# Patient Record
Sex: FEMALE | Race: WHITE | NOT HISPANIC OR LATINO | Employment: FULL TIME | URBAN - METROPOLITAN AREA
[De-identification: names, ages, dates, MRNs, and addresses within clinical notes are randomized per-mention and may not be internally consistent; named-entity substitution may affect disease eponyms.]

---

## 2023-04-13 PROBLEM — E78.5 HYPERLIPIDEMIA: Status: ACTIVE | Noted: 2023-04-13

## 2023-04-13 PROBLEM — Q79.60 EHLERS-DANLOS SYNDROME (HHS-HCC): Status: ACTIVE | Noted: 2023-04-13

## 2023-04-13 PROBLEM — M54.9 BACK PAIN, CHRONIC: Status: ACTIVE | Noted: 2023-04-13

## 2023-04-13 PROBLEM — G89.29 CHRONIC HEADACHES: Status: ACTIVE | Noted: 2023-04-13

## 2023-04-13 PROBLEM — E66.01 OBESITY, MORBID, BMI 50 OR HIGHER (MULTI): Status: ACTIVE | Noted: 2023-04-13

## 2023-04-13 PROBLEM — G89.29 BACK PAIN, CHRONIC: Status: ACTIVE | Noted: 2023-04-13

## 2023-04-13 PROBLEM — M25.562 ACUTE PAIN OF LEFT KNEE: Status: ACTIVE | Noted: 2023-04-13

## 2023-04-13 PROBLEM — M79.7 FIBROMYALGIA: Status: ACTIVE | Noted: 2023-04-13

## 2023-04-13 PROBLEM — E88.819 INSULIN RESISTANCE: Status: ACTIVE | Noted: 2023-04-13

## 2023-04-13 PROBLEM — R79.89 LOW VITAMIN D LEVEL: Status: ACTIVE | Noted: 2023-04-13

## 2023-04-13 PROBLEM — G43.909 MIGRAINES: Status: ACTIVE | Noted: 2023-04-13

## 2023-04-13 PROBLEM — F41.9 ANXIETY: Status: ACTIVE | Noted: 2023-04-13

## 2023-04-13 PROBLEM — B37.31 YEAST INFECTION OF THE VAGINA: Status: ACTIVE | Noted: 2023-04-13

## 2023-04-13 PROBLEM — J45.20 MILD INTERMITTENT ASTHMA WITHOUT COMPLICATION (HHS-HCC): Status: ACTIVE | Noted: 2023-04-13

## 2023-04-13 PROBLEM — R51.9 CHRONIC HEADACHES: Status: ACTIVE | Noted: 2023-04-13

## 2023-04-13 RX ORDER — METHYLPREDNISOLONE 4 MG/1
4 TABLET ORAL
COMMUNITY
Start: 2022-07-27 | End: 2023-04-14 | Stop reason: ALTCHOICE

## 2023-04-13 RX ORDER — GABAPENTIN 100 MG/1
100 CAPSULE ORAL 4 TIMES DAILY
COMMUNITY
End: 2023-07-05 | Stop reason: ALTCHOICE

## 2023-04-13 RX ORDER — TOPIRAMATE 100 MG/1
100 TABLET, FILM COATED ORAL 2 TIMES DAILY
COMMUNITY
End: 2023-04-14 | Stop reason: ALTCHOICE

## 2023-04-13 RX ORDER — SUMATRIPTAN SUCCINATE 4 MG/.5ML
INJECTION, SOLUTION SUBCUTANEOUS
COMMUNITY
Start: 2022-02-10 | End: 2023-04-14 | Stop reason: ALTCHOICE

## 2023-04-13 RX ORDER — ALBUTEROL SULFATE 90 UG/1
2 AEROSOL, METERED RESPIRATORY (INHALATION) EVERY 4 HOURS PRN
COMMUNITY
Start: 2020-09-25

## 2023-04-13 RX ORDER — CYCLOBENZAPRINE HCL 5 MG
5 TABLET ORAL 2 TIMES DAILY
COMMUNITY

## 2023-04-13 RX ORDER — CHOLECALCIFEROL (VITAMIN D3) 125 MCG
125 CAPSULE ORAL
COMMUNITY

## 2023-04-14 ENCOUNTER — OFFICE VISIT (OUTPATIENT)
Dept: PRIMARY CARE | Facility: CLINIC | Age: 48
End: 2023-04-14
Payer: COMMERCIAL

## 2023-04-14 VITALS
HEART RATE: 99 BPM | OXYGEN SATURATION: 97 % | DIASTOLIC BLOOD PRESSURE: 80 MMHG | SYSTOLIC BLOOD PRESSURE: 124 MMHG | HEIGHT: 63 IN | WEIGHT: 293 LBS | RESPIRATION RATE: 18 BRPM | BODY MASS INDEX: 51.91 KG/M2

## 2023-04-14 DIAGNOSIS — Z13.220 SCREENING FOR HYPERLIPIDEMIA: ICD-10-CM

## 2023-04-14 DIAGNOSIS — Z09 FOLLOW UP: ICD-10-CM

## 2023-04-14 DIAGNOSIS — G43.709 CHRONIC MIGRAINE WITHOUT AURA WITHOUT STATUS MIGRAINOSUS, NOT INTRACTABLE: Primary | ICD-10-CM

## 2023-04-14 DIAGNOSIS — R79.89 LOW VITAMIN D LEVEL: ICD-10-CM

## 2023-04-14 DIAGNOSIS — R73.01 ELEVATED FASTING BLOOD SUGAR: ICD-10-CM

## 2023-04-14 DIAGNOSIS — Z13.29 SCREENING FOR HYPOTHYROIDISM: ICD-10-CM

## 2023-04-14 DIAGNOSIS — E53.8 VITAMIN B 12 DEFICIENCY: ICD-10-CM

## 2023-04-14 DIAGNOSIS — Q79.60 EHLERS-DANLOS SYNDROME (HHS-HCC): ICD-10-CM

## 2023-04-14 PROCEDURE — 99214 OFFICE O/P EST MOD 30 MIN: CPT

## 2023-04-14 PROCEDURE — 4004F PT TOBACCO SCREEN RCVD TLK: CPT

## 2023-04-14 RX ORDER — BUTALBITAL, ACETAMINOPHEN AND CAFFEINE 300; 40; 50 MG/1; MG/1; MG/1
1 CAPSULE ORAL DAILY PRN
Qty: 30 CAPSULE | Refills: 0 | Status: SHIPPED | OUTPATIENT
Start: 2023-04-14 | End: 2023-05-14

## 2023-04-14 RX ORDER — GALCANEZUMAB 120 MG/ML
240 INJECTION, SOLUTION SUBCUTANEOUS ONCE
Qty: 2 EACH | Refills: 0 | Status: SHIPPED | OUTPATIENT
Start: 2023-04-14 | End: 2023-04-14

## 2023-04-14 RX ORDER — GALCANEZUMAB 120 MG/ML
120 INJECTION, SOLUTION SUBCUTANEOUS
Qty: 1 EACH | Refills: 2 | Status: SHIPPED | OUTPATIENT
Start: 2023-04-14 | End: 2023-04-19

## 2023-04-14 ASSESSMENT — ENCOUNTER SYMPTOMS
EYES NEGATIVE: 1
ALLERGIC/IMMUNOLOGIC NEGATIVE: 1
CARDIOVASCULAR NEGATIVE: 1
GASTROINTESTINAL NEGATIVE: 1
CONSTITUTIONAL NEGATIVE: 1
ENDOCRINE NEGATIVE: 1
RESPIRATORY NEGATIVE: 1
HEMATOLOGIC/LYMPHATIC NEGATIVE: 1
NEUROLOGICAL NEGATIVE: 1
MUSCULOSKELETAL NEGATIVE: 1
PSYCHIATRIC NEGATIVE: 1

## 2023-04-14 ASSESSMENT — PATIENT HEALTH QUESTIONNAIRE - PHQ9
SUM OF ALL RESPONSES TO PHQ9 QUESTIONS 1 AND 2: 0
2. FEELING DOWN, DEPRESSED OR HOPELESS: NOT AT ALL
1. LITTLE INTEREST OR PLEASURE IN DOING THINGS: NOT AT ALL

## 2023-04-14 NOTE — ASSESSMENT & PLAN NOTE
We discussed multiple avenues in regards to migraine treatment.  We have agreed to begin taking Emgality 120 mg prefilled syringe injection monthly.  I did let you know that the first dose for the month will be a double dose so you will have to inject the 2 syringes totaling 240 mg.  The following months will only require a single injection.  I have also sent Fioricet oral tablet daily as needed for breakthrough migraines.  Follow-up in 3 months.

## 2023-04-14 NOTE — PROGRESS NOTES
"Subjective   Patient ID: Yolanda Kumar is a 47 y.o. female who presents for Follow-up.    HPI a 47-year-old female past medical history of Mirela-Danlos syndrome, fibromyalgia, chronic migraines, and intermittent asthma without complications arrives to clinic with chief complaint of new patient establishment.  Patient is here to establish with a new provider as her previous one is no longer in network.  She does have a few concerns today about her migraines.  She was taking sumatriptan pen injectors as needed for migraines with as needed topiramate.  She recently found out from her insurance that she is no longer eligible to get these medications.  She is wondering if she can have a new medication regimen as her migraines are coming back worse than ever before and more frequently as well.  She denies any focal, hearing, vision deficits.  Other than this, patient denies any chest pain, shortness of breath, diarrhea, fevers, chills, head pain, COVID-like symptoms.    Review of Systems   Constitutional: Negative.    HENT: Negative.     Eyes: Negative.    Respiratory: Negative.     Cardiovascular: Negative.    Gastrointestinal: Negative.    Endocrine: Negative.    Genitourinary: Negative.    Musculoskeletal: Negative.    Skin: Negative.    Allergic/Immunologic: Negative.    Neurological: Negative.    Hematological: Negative.    Psychiatric/Behavioral: Negative.     All other systems reviewed and are negative.      Objective   /80 (BP Location: Right arm, BP Cuff Size: Large adult)   Pulse 99   Resp 18   Ht 1.6 m (5' 3\")   Wt 145 kg (320 lb 6.4 oz)   SpO2 97%   BMI 56.76 kg/m²     Physical Exam  Vitals and nursing note reviewed.   Constitutional:       Appearance: Normal appearance.   HENT:      Head: Normocephalic and atraumatic.      Right Ear: Tympanic membrane normal.      Left Ear: Tympanic membrane normal.      Nose: Nose normal.      Mouth/Throat:      Mouth: Mucous membranes are moist.      " Pharynx: Oropharynx is clear.   Eyes:      Extraocular Movements: Extraocular movements intact.      Conjunctiva/sclera: Conjunctivae normal.      Pupils: Pupils are equal, round, and reactive to light.   Cardiovascular:      Rate and Rhythm: Normal rate and regular rhythm.   Pulmonary:      Effort: Pulmonary effort is normal.      Breath sounds: Normal breath sounds.   Abdominal:      General: Bowel sounds are normal.      Palpations: Abdomen is soft.   Musculoskeletal:         General: Normal range of motion.      Cervical back: Normal range of motion and neck supple.   Skin:     General: Skin is warm.      Capillary Refill: Capillary refill takes less than 2 seconds.   Neurological:      General: No focal deficit present.      Mental Status: She is alert and oriented to person, place, and time. Mental status is at baseline.   Psychiatric:         Mood and Affect: Mood normal.         Behavior: Behavior normal.         Thought Content: Thought content normal.         Judgment: Judgment normal.         Assessment/Plan   Problem List Items Addressed This Visit          Musculoskeletal    Mirela-Danlos syndrome     Continue the treatment plan set forth by your rheumatologist.  Continue using gabapentin 100 mg oral tablet 4 times daily.            Other    Low vitamin D level    Relevant Orders    Vitamin D, Total    Migraines - Primary     We discussed multiple avenues in regards to migraine treatment.  We have agreed to begin taking Emgality 120 mg prefilled syringe injection monthly.  I did let you know that the first dose for the month will be a double dose so you will have to inject the 2 syringes totaling 240 mg.  The following months will only require a single injection.  I have also sent Fioricet oral tablet daily as needed for breakthrough migraines.  Follow-up in 3 months.         Relevant Medications    galcanezumab (Emgality Syringe) 120 mg/mL prefilled syringe    galcanezumab (Emgality Syringe) 120 mg/mL  prefilled syringe    butalbital-acetaminophen-caff (Fioricet) -40 mg capsule     Other Visit Diagnoses       Vitamin B 12 deficiency        Relevant Orders    CBC    Vitamin B12    Screening for hypothyroidism        Relevant Orders    TSH with reflex to Free T4 if abnormal    Screening for hyperlipidemia        Relevant Orders    Lipid Panel    Elevated fasting blood sugar        Relevant Orders    Comprehensive Metabolic Panel    Hemoglobin A1C    Follow up        Relevant Orders    Follow Up In [facility] - PCP          It was a pleasure seeing you in the office today.    I also advised you to follow low fat diet and exercise for at least 30 minutes daily.    Anticipatory guidance, age appropriate vaccines, screening exams, health promotion and prevention discussed.    This document was generated using the assistance of voice recognition software. If there are any errors of spelling, grammar, syntax, or meaning; please feel free to contact me directly for clarification.

## 2023-04-14 NOTE — ASSESSMENT & PLAN NOTE
Continue the treatment plan set forth by your rheumatologist.  Continue using gabapentin 100 mg oral tablet 4 times daily.

## 2023-04-14 NOTE — PROGRESS NOTES
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 She has imatrex injections and was also on topamax 100 mg twice daily. Insurance was not covering the topamax.

## 2023-04-15 ENCOUNTER — LAB (OUTPATIENT)
Dept: LAB | Facility: LAB | Age: 48
End: 2023-04-15
Payer: COMMERCIAL

## 2023-04-15 DIAGNOSIS — E53.8 VITAMIN B 12 DEFICIENCY: ICD-10-CM

## 2023-04-15 DIAGNOSIS — Z13.220 SCREENING FOR HYPERLIPIDEMIA: ICD-10-CM

## 2023-04-15 DIAGNOSIS — R79.89 LOW VITAMIN D LEVEL: ICD-10-CM

## 2023-04-15 DIAGNOSIS — R73.01 ELEVATED FASTING BLOOD SUGAR: ICD-10-CM

## 2023-04-15 DIAGNOSIS — Z13.29 SCREENING FOR HYPOTHYROIDISM: ICD-10-CM

## 2023-04-15 LAB
ALANINE AMINOTRANSFERASE (SGPT) (U/L) IN SER/PLAS: 14 U/L (ref 7–45)
ALBUMIN (G/DL) IN SER/PLAS: 3.7 G/DL (ref 3.4–5)
ALKALINE PHOSPHATASE (U/L) IN SER/PLAS: 74 U/L (ref 33–110)
ANION GAP IN SER/PLAS: 14 MMOL/L (ref 10–20)
ASPARTATE AMINOTRANSFERASE (SGOT) (U/L) IN SER/PLAS: 14 U/L (ref 9–39)
BILIRUBIN TOTAL (MG/DL) IN SER/PLAS: 0.4 MG/DL (ref 0–1.2)
CALCIDIOL (25 OH VITAMIN D3) (NG/ML) IN SER/PLAS: 11 NG/ML
CALCIUM (MG/DL) IN SER/PLAS: 8.5 MG/DL (ref 8.6–10.3)
CARBON DIOXIDE, TOTAL (MMOL/L) IN SER/PLAS: 22 MMOL/L (ref 21–32)
CHLORIDE (MMOL/L) IN SER/PLAS: 106 MMOL/L (ref 98–107)
CHOLESTEROL (MG/DL) IN SER/PLAS: 212 MG/DL (ref 0–199)
CHOLESTEROL IN HDL (MG/DL) IN SER/PLAS: 46.3 MG/DL
CHOLESTEROL/HDL RATIO: 4.6
COBALAMIN (VITAMIN B12) (PG/ML) IN SER/PLAS: 169 PG/ML (ref 211–911)
CREATININE (MG/DL) IN SER/PLAS: 0.56 MG/DL (ref 0.5–1.05)
ERYTHROCYTE DISTRIBUTION WIDTH (RATIO) BY AUTOMATED COUNT: 13.6 % (ref 11.5–14.5)
ERYTHROCYTE MEAN CORPUSCULAR HEMOGLOBIN CONCENTRATION (G/DL) BY AUTOMATED: 32.4 G/DL (ref 32–36)
ERYTHROCYTE MEAN CORPUSCULAR VOLUME (FL) BY AUTOMATED COUNT: 93 FL (ref 80–100)
ERYTHROCYTES (10*6/UL) IN BLOOD BY AUTOMATED COUNT: 4.99 X10E12/L (ref 4–5.2)
ESTIMATED AVERAGE GLUCOSE FOR HBA1C: 111 MG/DL
GFR FEMALE: >90 ML/MIN/1.73M2
GLUCOSE (MG/DL) IN SER/PLAS: 83 MG/DL (ref 74–99)
HEMATOCRIT (%) IN BLOOD BY AUTOMATED COUNT: 46.6 % (ref 36–46)
HEMOGLOBIN (G/DL) IN BLOOD: 15.1 G/DL (ref 12–16)
HEMOGLOBIN A1C/HEMOGLOBIN TOTAL IN BLOOD: 5.5 %
LDL: 145 MG/DL (ref 0–99)
LEUKOCYTES (10*3/UL) IN BLOOD BY AUTOMATED COUNT: 8 X10E9/L (ref 4.4–11.3)
PLATELETS (10*3/UL) IN BLOOD AUTOMATED COUNT: 318 X10E9/L (ref 150–450)
POTASSIUM (MMOL/L) IN SER/PLAS: 4.5 MMOL/L (ref 3.5–5.3)
PROTEIN TOTAL: 6.1 G/DL (ref 6.4–8.2)
SODIUM (MMOL/L) IN SER/PLAS: 137 MMOL/L (ref 136–145)
THYROTROPIN (MIU/L) IN SER/PLAS BY DETECTION LIMIT <= 0.05 MIU/L: 3.1 MIU/L (ref 0.44–3.98)
TRIGLYCERIDE (MG/DL) IN SER/PLAS: 104 MG/DL (ref 0–149)
UREA NITROGEN (MG/DL) IN SER/PLAS: 13 MG/DL (ref 6–23)
VLDL: 21 MG/DL (ref 0–40)

## 2023-04-15 PROCEDURE — 83036 HEMOGLOBIN GLYCOSYLATED A1C: CPT

## 2023-04-15 PROCEDURE — 80053 COMPREHEN METABOLIC PANEL: CPT

## 2023-04-15 PROCEDURE — 82607 VITAMIN B-12: CPT

## 2023-04-15 PROCEDURE — 85027 COMPLETE CBC AUTOMATED: CPT

## 2023-04-15 PROCEDURE — 36415 COLL VENOUS BLD VENIPUNCTURE: CPT

## 2023-04-15 PROCEDURE — 84443 ASSAY THYROID STIM HORMONE: CPT

## 2023-04-15 PROCEDURE — 80061 LIPID PANEL: CPT

## 2023-04-15 PROCEDURE — 82306 VITAMIN D 25 HYDROXY: CPT

## 2023-04-18 ENCOUNTER — TELEPHONE (OUTPATIENT)
Dept: PRIMARY CARE | Facility: CLINIC | Age: 48
End: 2023-04-18
Payer: COMMERCIAL

## 2023-04-18 DIAGNOSIS — G43.719 INTRACTABLE CHRONIC MIGRAINE WITHOUT AURA AND WITHOUT STATUS MIGRAINOSUS: Primary | ICD-10-CM

## 2023-04-18 NOTE — TELEPHONE ENCOUNTER
She called about wanting to know if her auth for the Emgality was approved cause your ins wont cover it or is there anything else you can take please call her at [phone]

## 2023-04-19 NOTE — TELEPHONE ENCOUNTER
Please let her know that I have sent in Nurtec 75mg oral tablet every other day for her migraines. If insurance will not cover this one, we will go back to the sumatriptan injections. Thanks

## 2023-04-19 NOTE — TELEPHONE ENCOUNTER
Per patient - this was denied by insurance and she is asking for something else that will be covered by insurance.

## 2023-05-18 ENCOUNTER — TELEPHONE (OUTPATIENT)
Dept: PRIMARY CARE | Facility: CLINIC | Age: 48
End: 2023-05-18
Payer: COMMERCIAL

## 2023-05-18 DIAGNOSIS — G43.719 INTRACTABLE CHRONIC MIGRAINE WITHOUT AURA AND WITHOUT STATUS MIGRAINOSUS: ICD-10-CM

## 2023-07-05 DIAGNOSIS — G43.709 CHRONIC MIGRAINE WITHOUT AURA WITHOUT STATUS MIGRAINOSUS, NOT INTRACTABLE: Primary | ICD-10-CM

## 2023-07-05 RX ORDER — GABAPENTIN 400 MG/1
400 CAPSULE ORAL 4 TIMES DAILY
COMMUNITY

## 2023-07-05 RX ORDER — BUTALBITAL, ACETAMINOPHEN AND CAFFEINE 300; 40; 50 MG/1; MG/1; MG/1
1 CAPSULE ORAL DAILY PRN
COMMUNITY
End: 2023-07-05 | Stop reason: SDUPTHER

## 2023-07-05 RX ORDER — AMITRIPTYLINE HYDROCHLORIDE 10 MG/1
10 TABLET, FILM COATED ORAL NIGHTLY
COMMUNITY

## 2023-07-05 RX ORDER — BUTALBITAL, ACETAMINOPHEN AND CAFFEINE 300; 40; 50 MG/1; MG/1; MG/1
1 CAPSULE ORAL DAILY PRN
Qty: 14 CAPSULE | Refills: 0 | Status: SHIPPED | OUTPATIENT
Start: 2023-07-05

## 2023-07-05 RX ORDER — TOPIRAMATE 50 MG/1
50 TABLET, FILM COATED ORAL 2 TIMES DAILY
Qty: 60 TABLET | Refills: 5 | COMMUNITY
Start: 2023-06-05 | End: 2023-12-02

## 2023-07-14 ENCOUNTER — OFFICE VISIT (OUTPATIENT)
Dept: PRIMARY CARE | Facility: CLINIC | Age: 48
End: 2023-07-14
Payer: COMMERCIAL

## 2023-07-14 VITALS
HEART RATE: 107 BPM | BODY MASS INDEX: 51.91 KG/M2 | OXYGEN SATURATION: 98 % | DIASTOLIC BLOOD PRESSURE: 78 MMHG | WEIGHT: 293 LBS | HEIGHT: 63 IN | RESPIRATION RATE: 16 BRPM | SYSTOLIC BLOOD PRESSURE: 124 MMHG

## 2023-07-14 DIAGNOSIS — Q79.60 EHLERS-DANLOS SYNDROME (HHS-HCC): Primary | ICD-10-CM

## 2023-07-14 DIAGNOSIS — G43.709 CHRONIC MIGRAINE WITHOUT AURA WITHOUT STATUS MIGRAINOSUS, NOT INTRACTABLE: ICD-10-CM

## 2023-07-14 DIAGNOSIS — Z09 FOLLOW UP: ICD-10-CM

## 2023-07-14 DIAGNOSIS — R63.5 WEIGHT GAIN: ICD-10-CM

## 2023-07-14 DIAGNOSIS — R22.43 LOCALIZED SWELLING OF BOTH LOWER LEGS: ICD-10-CM

## 2023-07-14 PROCEDURE — 4004F PT TOBACCO SCREEN RCVD TLK: CPT

## 2023-07-14 PROCEDURE — 99214 OFFICE O/P EST MOD 30 MIN: CPT

## 2023-07-14 RX ORDER — TORSEMIDE 10 MG/1
10 TABLET ORAL DAILY
Qty: 30 TABLET | Refills: 0 | Status: SHIPPED | OUTPATIENT
Start: 2023-07-14 | End: 2023-08-16 | Stop reason: SDUPTHER

## 2023-07-14 RX ORDER — BUPROPION HYDROCHLORIDE 75 MG/1
75 TABLET ORAL DAILY
Qty: 30 TABLET | Refills: 0 | Status: SHIPPED | OUTPATIENT
Start: 2023-07-14 | End: 2023-08-16 | Stop reason: SDUPTHER

## 2023-07-14 RX ORDER — NALTREXONE HYDROCHLORIDE 50 MG/1
50 TABLET, FILM COATED ORAL DAILY
Qty: 30 TABLET | Refills: 0 | Status: SHIPPED | OUTPATIENT
Start: 2023-07-14 | End: 2023-08-16 | Stop reason: SDUPTHER

## 2023-07-14 ASSESSMENT — PATIENT HEALTH QUESTIONNAIRE - PHQ9
2. FEELING DOWN, DEPRESSED OR HOPELESS: NOT AT ALL
SUM OF ALL RESPONSES TO PHQ9 QUESTIONS 1 AND 2: 0
1. LITTLE INTEREST OR PLEASURE IN DOING THINGS: NOT AT ALL

## 2023-07-14 ASSESSMENT — ENCOUNTER SYMPTOMS
CARDIOVASCULAR NEGATIVE: 1
GASTROINTESTINAL NEGATIVE: 1
PSYCHIATRIC NEGATIVE: 1
MUSCULOSKELETAL NEGATIVE: 1
ENDOCRINE NEGATIVE: 1
HEMATOLOGIC/LYMPHATIC NEGATIVE: 1
NEUROLOGICAL NEGATIVE: 1
LOSS OF SENSATION IN FEET: 0
OCCASIONAL FEELINGS OF UNSTEADINESS: 0
CONSTITUTIONAL NEGATIVE: 1
EYES NEGATIVE: 1
RESPIRATORY NEGATIVE: 1
DEPRESSION: 0
ALLERGIC/IMMUNOLOGIC NEGATIVE: 1

## 2023-07-14 ASSESSMENT — ANXIETY QUESTIONNAIRES
1. FEELING NERVOUS, ANXIOUS, OR ON EDGE: NOT AT ALL
2. NOT BEING ABLE TO STOP OR CONTROL WORRYING: NOT AT ALL
7. FEELING AFRAID AS IF SOMETHING AWFUL MIGHT HAPPEN: NOT AT ALL
IF YOU CHECKED OFF ANY PROBLEMS ON THIS QUESTIONNAIRE, HOW DIFFICULT HAVE THESE PROBLEMS MADE IT FOR YOU TO DO YOUR WORK, TAKE CARE OF THINGS AT HOME, OR GET ALONG WITH OTHER PEOPLE: NOT DIFFICULT AT ALL
6. BECOMING EASILY ANNOYED OR IRRITABLE: NOT AT ALL
5. BEING SO RESTLESS THAT IT IS HARD TO SIT STILL: NOT AT ALL
3. WORRYING TOO MUCH ABOUT DIFFERENT THINGS: NOT AT ALL
GAD7 TOTAL SCORE: 0
4. TROUBLE RELAXING: NOT AT ALL

## 2023-07-14 NOTE — PROGRESS NOTES
"Subjective   Patient ID: Yolanda Kumar is a 48 y.o. female who presents for Follow-up.    HPI a 48-year-old female with past medical history of Mirela-Danlos syndrome, chronic migraines, asthma, current cigarette smoker, chronic lower back pain arrives to the clinic with chief complaint of 3-month follow-up.  At her last appointment, she was given directions to complete blood work.  She did complete that today and would like to review them.  She was also placed on sumatriptan injections and topiramate daily for her migraines in the past.  We did switch her migraine cocktail regimen since her last appointment.  She reports success with the Nurtec 75 mg every other day, Elavil 10 mg nightly, and topiramate 50 mg oral tablet twice a day with Fioricet as needed.  She states that her migraines decreased from daily to about 10-15 migraines a month she states that this is much more manageable.  She is here for further evaluation and health maintenance over the dependent lower extremity swelling and weight loss therapy.    Review of Systems   Constitutional: Negative.    HENT: Negative.     Eyes: Negative.    Respiratory: Negative.     Cardiovascular: Negative.    Gastrointestinal: Negative.    Endocrine: Negative.    Genitourinary: Negative.    Musculoskeletal: Negative.    Skin: Negative.    Allergic/Immunologic: Negative.    Neurological: Negative.    Hematological: Negative.    Psychiatric/Behavioral: Negative.     All other systems reviewed and are negative.      Objective   /78   Pulse 107   Resp 16   Ht 1.6 m (5' 3\")   Wt 144 kg (317 lb 9.6 oz)   SpO2 98%   BMI 56.26 kg/m²     Physical Exam  Vitals and nursing note reviewed.   Constitutional:       Appearance: Normal appearance.   HENT:      Head: Normocephalic and atraumatic.      Right Ear: Tympanic membrane normal.      Left Ear: Tympanic membrane normal.      Nose: Nose normal.      Mouth/Throat:      Mouth: Mucous membranes are moist.      " Pharynx: Oropharynx is clear.   Eyes:      Extraocular Movements: Extraocular movements intact.      Conjunctiva/sclera: Conjunctivae normal.      Pupils: Pupils are equal, round, and reactive to light.   Cardiovascular:      Rate and Rhythm: Normal rate and regular rhythm.   Pulmonary:      Effort: Pulmonary effort is normal.      Breath sounds: Normal breath sounds.   Abdominal:      General: Bowel sounds are normal.      Palpations: Abdomen is soft.   Musculoskeletal:         General: Normal range of motion.      Cervical back: Normal range of motion and neck supple.   Skin:     General: Skin is warm.      Capillary Refill: Capillary refill takes less than 2 seconds.   Neurological:      General: No focal deficit present.      Mental Status: She is alert and oriented to person, place, and time. Mental status is at baseline.   Psychiatric:         Mood and Affect: Mood normal.         Behavior: Behavior normal.         Thought Content: Thought content normal.         Judgment: Judgment normal.         Assessment/Plan   Problem List Items Addressed This Visit       Mirela-Danlos syndrome - Primary    Relevant Orders    Follow Up In [facility] - PCP - Established    Migraines    Relevant Medications    rimegepant (Nurtec ODT) 75 mg tablet,disintegrating    Other Relevant Orders    Follow Up [facility] - PCP - Established     Other Visit Diagnoses       Follow up        Relevant Orders    Follow Up [facility] - PCP - Established    Localized swelling of both lower legs        Relevant Medications    torsemide (Demadex) 10 mg tablet    Other Relevant Orders    Referral to Vascular Medicine    Follow Up In [facility] - PCP - Established    Weight gain        Relevant Medications    buPROPion (Wellbutrin) 75 mg tablet    naltrexone (Depade) 50 mg tablet          It was a pleasure seeing you in the office today.    Please begin taking Torsemide 10mg oral tablet daily for  swelling of the feet. Please elevate your feet and wear compression stockings at this time. Follow up with Vascular Medicine Emily Tian CNP for further eval and maintenance.     Lab work is unremarkable unless otherwise stated below:    Vitamin D is decreased. Begin OTC vitamin D3 + Calcium.    Vitamin B12 is decreased. Begin Vitamin B12 complex OTC.    You report that your migraines have decreased from daily to roughly 10-15 times a month which you report is manageable. You stated in the office today that you would like to continue your medication management of Elavil 10mg night, fioricet PRN, nurtec 75mg every other day, and topiramate 50mg BID. You will call us back for worsening symptoms.     You report having the inability to lose weight. Please begin Wellbutrin 75mg oral tablet daily and naltrexone 50mg 1/2 tablet daily. These medications combine is equivalent to contrave. Follow up in 1 month.    I also advised you to follow low fat diet and exercise for at least 30 minutes daily.    Anticipatory guidance, age appropriate vaccines, screening exams, health promotion and prevention discussed.    This document was generated using the assistance of voice recognition software. If there are any errors of spelling, grammar, syntax, or meaning; please feel free to contact me directly for clarification.

## 2023-08-16 ENCOUNTER — OFFICE VISIT (OUTPATIENT)
Dept: PRIMARY CARE | Facility: CLINIC | Age: 48
End: 2023-08-16
Payer: COMMERCIAL

## 2023-08-16 VITALS
RESPIRATION RATE: 16 BRPM | SYSTOLIC BLOOD PRESSURE: 108 MMHG | BODY MASS INDEX: 51.91 KG/M2 | OXYGEN SATURATION: 99 % | WEIGHT: 293 LBS | HEIGHT: 63 IN | DIASTOLIC BLOOD PRESSURE: 68 MMHG | HEART RATE: 92 BPM

## 2023-08-16 DIAGNOSIS — R22.43 LOCALIZED SWELLING OF BOTH LOWER LEGS: ICD-10-CM

## 2023-08-16 DIAGNOSIS — Z09 ENCOUNTER FOR FOLLOW-UP: Primary | ICD-10-CM

## 2023-08-16 DIAGNOSIS — R63.5 WEIGHT GAIN: ICD-10-CM

## 2023-08-16 DIAGNOSIS — G43.709 CHRONIC MIGRAINE WITHOUT AURA WITHOUT STATUS MIGRAINOSUS, NOT INTRACTABLE: ICD-10-CM

## 2023-08-16 DIAGNOSIS — Q79.60 EHLERS-DANLOS SYNDROME (HHS-HCC): ICD-10-CM

## 2023-08-16 PROCEDURE — 99213 OFFICE O/P EST LOW 20 MIN: CPT

## 2023-08-16 PROCEDURE — 4004F PT TOBACCO SCREEN RCVD TLK: CPT

## 2023-08-16 RX ORDER — NALTREXONE HYDROCHLORIDE 50 MG/1
25 TABLET, FILM COATED ORAL DAILY
Qty: 45 TABLET | Refills: 3 | Status: SHIPPED | OUTPATIENT
Start: 2023-08-16 | End: 2024-02-21 | Stop reason: SDUPTHER

## 2023-08-16 RX ORDER — BUPROPION HYDROCHLORIDE 75 MG/1
150 TABLET ORAL DAILY
Qty: 180 TABLET | Refills: 3 | Status: SHIPPED | OUTPATIENT
Start: 2023-08-16 | End: 2024-02-21 | Stop reason: SDUPTHER

## 2023-08-16 RX ORDER — TORSEMIDE 10 MG/1
10 TABLET ORAL DAILY
Qty: 90 TABLET | Refills: 3 | Status: SHIPPED | OUTPATIENT
Start: 2023-08-16 | End: 2024-02-21 | Stop reason: SDUPTHER

## 2023-08-16 ASSESSMENT — ANXIETY QUESTIONNAIRES
GAD7 TOTAL SCORE: 0
4. TROUBLE RELAXING: NOT AT ALL
3. WORRYING TOO MUCH ABOUT DIFFERENT THINGS: NOT AT ALL
IF YOU CHECKED OFF ANY PROBLEMS ON THIS QUESTIONNAIRE, HOW DIFFICULT HAVE THESE PROBLEMS MADE IT FOR YOU TO DO YOUR WORK, TAKE CARE OF THINGS AT HOME, OR GET ALONG WITH OTHER PEOPLE: NOT DIFFICULT AT ALL
6. BECOMING EASILY ANNOYED OR IRRITABLE: NOT AT ALL
7. FEELING AFRAID AS IF SOMETHING AWFUL MIGHT HAPPEN: NOT AT ALL
1. FEELING NERVOUS, ANXIOUS, OR ON EDGE: NOT AT ALL
2. NOT BEING ABLE TO STOP OR CONTROL WORRYING: NOT AT ALL
5. BEING SO RESTLESS THAT IT IS HARD TO SIT STILL: NOT AT ALL

## 2023-08-16 ASSESSMENT — ENCOUNTER SYMPTOMS
LOSS OF SENSATION IN FEET: 0
DEPRESSION: 0
NEUROLOGICAL NEGATIVE: 1
MUSCULOSKELETAL NEGATIVE: 1
ENDOCRINE NEGATIVE: 1
HEMATOLOGIC/LYMPHATIC NEGATIVE: 1
GASTROINTESTINAL NEGATIVE: 1
ALLERGIC/IMMUNOLOGIC NEGATIVE: 1
EYES NEGATIVE: 1
OCCASIONAL FEELINGS OF UNSTEADINESS: 0
RESPIRATORY NEGATIVE: 1
CARDIOVASCULAR NEGATIVE: 1
CONSTITUTIONAL NEGATIVE: 1
PSYCHIATRIC NEGATIVE: 1

## 2023-08-16 ASSESSMENT — PATIENT HEALTH QUESTIONNAIRE - PHQ9
1. LITTLE INTEREST OR PLEASURE IN DOING THINGS: NOT AT ALL
SUM OF ALL RESPONSES TO PHQ9 QUESTIONS 1 AND 2: 0
2. FEELING DOWN, DEPRESSED OR HOPELESS: NOT AT ALL

## 2023-08-16 NOTE — PATIENT INSTRUCTIONS
6 month follow up with Ivonne Mars CNP.    No additional lab work necessary.    Medications sent to pharmacy.

## 2023-08-16 NOTE — PROGRESS NOTES
"Subjective   Patient ID: Yolanda Kumar is a 48 y.o. female who presents for Follow-up.    HPI a 48-year-old female with past medical history of Mirela-Danlos syndrome, chronic migraines, asthma, current cigarette smoker, chronic lower back pain arrives to the clinic with chief complaint of 1-month follow-up. At her last appointment, she was started on wellbutrin and naltrexone for smoking cessation, weight loss, and appetite management. She reports success with this new medication regimen. She also reports success with her migraine regimen consisting of Nurtec 75 mg every other day, Elavil 10 mg nightly, and topiramate 50 mg oral tablet twice a day with Fioricet as needed. She states that her migraines decreased from daily to about 1-2 migraines a month. [facility] reports that there is nothing vascular related in regards to your bilateral lower extremity swelling and that she should simply wear stockings to help with swelling.  She reports success with the torsemide as well.    Review of Systems   Constitutional: Negative.    HENT: Negative.     Eyes: Negative.    Respiratory: Negative.     Cardiovascular: Negative.    Gastrointestinal: Negative.    Endocrine: Negative.    Genitourinary: Negative.    Musculoskeletal: Negative.    Skin: Negative.    Allergic/Immunologic: Negative.    Neurological: Negative.    Hematological: Negative.    Psychiatric/Behavioral: Negative.     All other systems reviewed and are negative.      Objective   /68   Pulse 92   Resp 16   Ht 1.6 m (5' 3\")   Wt 144 kg (317 lb 6.4 oz)   SpO2 99%   BMI 56.22 kg/m²     Physical Exam  Vitals and nursing note reviewed.   Constitutional:       Appearance: Normal appearance.   HENT:      Head: Normocephalic and atraumatic.      Right Ear: Tympanic membrane normal.      Left Ear: Tympanic membrane normal.      Nose: Nose normal.      Mouth/Throat:      Mouth: Mucous membranes are moist.    "   Pharynx: Oropharynx is clear.   Eyes:      Extraocular Movements: Extraocular movements intact.      Conjunctiva/sclera: Conjunctivae normal.      Pupils: Pupils are equal, round, and reactive to light.   Cardiovascular:      Rate and Rhythm: Normal rate and regular rhythm.   Pulmonary:      Effort: Pulmonary effort is normal.      Breath sounds: Normal breath sounds.   Abdominal:      General: Bowel sounds are normal.      Palpations: Abdomen is soft.   Musculoskeletal:         General: Normal range of motion.      Cervical back: Normal range of motion and neck supple.   Skin:     General: Skin is warm.      Capillary Refill: Capillary refill takes less than 2 seconds.   Neurological:      General: No focal deficit present.      Mental Status: She is alert and oriented to person, place, and time. Mental status is at baseline.   Psychiatric:         Mood and Affect: Mood normal.         Behavior: Behavior normal.         Thought Content: Thought content normal.         Judgment: Judgment normal.         Assessment/Plan   Problem List Items Addressed This Visit       Mirela-Danlos syndrome    Relevant Orders    Follow Up In [facility] - PCP - Established    Migraines    Relevant Medications    rimegepant (Nurtec ODT) 75 mg tablet,disintegrating    Other Relevant Orders    Follow Up In [facility] - PCP - Established     Other Visit Diagnoses       Encounter for follow-up    -  Primary    Relevant Orders    Follow Up In [facility] - PCP - Established    Localized swelling of both lower legs        Relevant Medications    torsemide (Demadex) 10 mg tablet    Other Relevant Orders    Follow Up In [facility] - PCP - Established    Weight gain        Relevant Medications    naltrexone (Depade) 50 mg tablet    buPROPion (Wellbutrin) 75 mg tablet    Other Relevant Orders    Follow Up In [facility] - PCP - Established          It was a pleasure seeing you in the office  today.    You report success with Wellbutrin 75 mg oral tablet daily and naltrexone 25 mg oral tablet daily weight management, smoking cessation, appetite suppressant.  We have increased your Wellbutrin 75 mg oral tablet daily to Wellbutrin 150 mg oral tablet daily.  Continue naltrexone 25 mg oral tablet daily.    Continue migraine cocktail as prescribed as they are working control your symptoms.    No additional screening exams or vaccinations are warranted at this time.    No additional lab work is necessary at this time either.    Your next appointment will be in 6 months with Ivonne NOBLE.    I also advised you to follow low fat diet and exercise for at least 30 minutes daily.    Anticipatory guidance, age appropriate vaccines, screening exams, health promotion and prevention discussed.    This document was generated using the assistance of voice recognition software. If there are any errors of spelling, grammar, syntax, or meaning; please feel free to contact me directly for clarification.

## 2024-01-31 ENCOUNTER — HOSPITAL ENCOUNTER (EMERGENCY)
Facility: HOSPITAL | Age: 49
Discharge: HOME | End: 2024-01-31
Payer: COMMERCIAL

## 2024-01-31 VITALS
DIASTOLIC BLOOD PRESSURE: 86 MMHG | HEIGHT: 63 IN | SYSTOLIC BLOOD PRESSURE: 131 MMHG | OXYGEN SATURATION: 99 % | RESPIRATION RATE: 12 BRPM | WEIGHT: 293 LBS | HEART RATE: 98 BPM | TEMPERATURE: 97.1 F | BODY MASS INDEX: 51.91 KG/M2

## 2024-01-31 DIAGNOSIS — L03.012 CELLULITIS OF FINGER OF LEFT HAND: Primary | ICD-10-CM

## 2024-01-31 PROCEDURE — 2500000004 HC RX 250 GENERAL PHARMACY W/ HCPCS (ALT 636 FOR OP/ED): Performed by: NURSE PRACTITIONER

## 2024-01-31 PROCEDURE — 99283 EMERGENCY DEPT VISIT LOW MDM: CPT

## 2024-01-31 PROCEDURE — 2500000001 HC RX 250 WO HCPCS SELF ADMINISTERED DRUGS (ALT 637 FOR MEDICARE OP): Performed by: NURSE PRACTITIONER

## 2024-01-31 RX ORDER — NAPROXEN 500 MG/1
500 TABLET ORAL 2 TIMES DAILY PRN
Qty: 14 TABLET | Refills: 0 | Status: SHIPPED | OUTPATIENT
Start: 2024-01-31 | End: 2024-02-07

## 2024-01-31 RX ORDER — CEPHALEXIN 500 MG/1
500 CAPSULE ORAL 4 TIMES DAILY
Qty: 28 CAPSULE | Refills: 0 | Status: SHIPPED | OUTPATIENT
Start: 2024-01-31 | End: 2024-02-07

## 2024-01-31 RX ORDER — SULFAMETHOXAZOLE AND TRIMETHOPRIM 800; 160 MG/1; MG/1
1 TABLET ORAL 2 TIMES DAILY
Qty: 14 TABLET | Refills: 0 | Status: SHIPPED | OUTPATIENT
Start: 2024-01-31 | End: 2024-02-07

## 2024-01-31 RX ORDER — CEPHALEXIN 250 MG/1
500 CAPSULE ORAL ONCE
Status: COMPLETED | OUTPATIENT
Start: 2024-01-31 | End: 2024-01-31

## 2024-01-31 RX ORDER — SULFAMETHOXAZOLE AND TRIMETHOPRIM 800; 160 MG/1; MG/1
1 TABLET ORAL ONCE
Status: COMPLETED | OUTPATIENT
Start: 2024-01-31 | End: 2024-01-31

## 2024-01-31 RX ADMIN — CEPHALEXIN 500 MG: 250 CAPSULE ORAL at 10:53

## 2024-01-31 RX ADMIN — SULFAMETHOXAZOLE AND TRIMETHOPRIM 1 TABLET: 800; 160 TABLET ORAL at 10:53

## 2024-01-31 ASSESSMENT — PAIN - FUNCTIONAL ASSESSMENT: PAIN_FUNCTIONAL_ASSESSMENT: 0-10

## 2024-01-31 ASSESSMENT — PAIN DESCRIPTION - PAIN TYPE: TYPE: ACUTE PAIN

## 2024-01-31 ASSESSMENT — PAIN SCALES - GENERAL: PAINLEVEL_OUTOF10: 10 - WORST POSSIBLE PAIN

## 2024-01-31 ASSESSMENT — COLUMBIA-SUICIDE SEVERITY RATING SCALE - C-SSRS
6. HAVE YOU EVER DONE ANYTHING, STARTED TO DO ANYTHING, OR PREPARED TO DO ANYTHING TO END YOUR LIFE?: NO
2. HAVE YOU ACTUALLY HAD ANY THOUGHTS OF KILLING YOURSELF?: NO
1. IN THE PAST MONTH, HAVE YOU WISHED YOU WERE DEAD OR WISHED YOU COULD GO TO SLEEP AND NOT WAKE UP?: NO

## 2024-01-31 NOTE — ED PROVIDER NOTES
HPI   Chief Complaint   Patient presents with    Hand Pain     2nd digit        48-year-old female with a past history of hypertension and fibromyalgia presents to the emergency department today for left second finger pain.  Patient states that she had a staple go into the tip of her finger a couple of days ago.  She developed some discomfort over the past 2 days however last night seem to worsen and this morning when she woke up she noticed swelling in the finger.  Patient states that she is having significant pain with movement of the finger however she is able to flex and extend.  The swelling began at the tip of the finger is now slightly going down proximally.  No fever or chills.  She is not having any discharge.  Redness and warmth.  She has not noticed any streaking.  No injury concerning for fracture.  Doing well overall otherwise.                          Lamont Coma Scale Score: 15                  Patient History   Past Medical History:   Diagnosis Date    Acquired clubfoot, right foot     Acquired bilateral club feet    Essential (primary) hypertension 09/25/2020    HTN (hypertension), benign    Fibromyalgia, primary     Interstitial cystitis (chronic) without hematuria     IC (interstitial cystitis)    Personal history of other diseases of the nervous system and sense organs     History of cataract    Personal history of other diseases of the respiratory system     History of allergic rhinitis     Past Surgical History:   Procedure Laterality Date    OTHER SURGICAL HISTORY  09/23/2020    Cholecystectomy    OTHER SURGICAL HISTORY  09/23/2020    Tonsillectomy with adenoidectomy    OTHER SURGICAL HISTORY  09/23/2020    Oophorectomy    OTHER SURGICAL HISTORY  09/23/2020    Foot surgery    OTHER SURGICAL HISTORY  09/23/2020    Cataract surgery    OTHER SURGICAL HISTORY  02/10/2022    Hysterectomy     No family history on file.  Social History     Tobacco Use    Smoking status: Every Day     Packs/day: 0.50      Years: 25.00     Additional pack years: 0.00     Total pack years: 12.50     Types: Cigarettes    Smokeless tobacco: Never   Vaping Use    Vaping Use: Never used   Substance Use Topics    Alcohol use: Not on file    Drug use: Never       Physical Exam   ED Triage Vitals [01/31/24 0931]   Temperature Heart Rate Respirations BP   36.2 °C (97.1 °F) 98 12 131/86      Pulse Ox Temp Source Heart Rate Source Patient Position   99 % Tympanic -- --      BP Location FiO2 (%)     -- --       Physical Exam  Vitals reviewed.   Constitutional:       Appearance: Normal appearance.   HENT:      Head: Normocephalic.   Cardiovascular:      Rate and Rhythm: Normal rate and regular rhythm.   Pulmonary:      Effort: Pulmonary effort is normal.      Breath sounds: Normal breath sounds.   Abdominal:      General: Abdomen is flat.      Palpations: Abdomen is soft.   Musculoskeletal:      Right hand: Normal.      Left hand: Tenderness present. No lacerations or bony tenderness. Decreased range of motion. Normal strength. Normal capillary refill. Normal pulse.        Hands:       Comments: Erythema, swelling, warmth.  Full active and passive range of motion.  No abscess.   Skin:     General: Skin is warm and dry.      Capillary Refill: Capillary refill takes less than 2 seconds.   Neurological:      Mental Status: She is alert.         Labs Reviewed - No data to display  Pain Management Panel           No data to display              No orders to display       ED Course & MDM   Diagnoses as of 01/31/24 1034   Cellulitis of finger of left hand       Medical Decision Making  On initial evaluation patient is well-appearing the emergency department at this time.  She is having discomfort in the finger.  There is no uniform swelling.  Patient has active and passive range of motion with no pain with passive extension.  No percussion tenderness or uniform swelling therefore lower suspicion for flexor tenosynovitis.  There is no lymphangitic  streaking.  Patient has no fever or chills.  Vital signs are stable otherwise.  Will medicate patient with Keflex and Bactrim and discharged home with antibiotics as well as anti-inflammatories for the pain.  I discussed strict return precautions with the patient and close outpatient follow-up.  She verbalized understanding agreement this plan has no further questions or concerns and patient be discharged home in stable condition.        Procedure  Procedures      Differential Diagnoses include cellulitis, abscess,  flexor tenosynovitis, paronychia.  This is not an exhaustive list of all the diagnosis and therapeutics are considered during the patient's evaluation for an emergency medical condition.    I discussed the differential, results and discharge plan with the patient and/or family/friend/caregiver if present.  I emphasized the importance of follow-up with the physician I referred them to in the timeframe recommended.  I explained reasons for the patient to return to the Emergency Department. Additional verbal discharge instructions were also given and discussed with the patient to supplement those generated by the EMR. We also discussed medications that were prescribed (if any) including common side effects and interactions. The patient was advised to abstain from driving, operating heavy machinery or making significant decisions while taking medications such as opiates and muscle relaxers that may impair this. All questions were addressed.  They understand return precautions and discharge instructions. The patient and/or family/friend/caregiver expressed understanding.           Delma Salmon, RUDI-AIDE  01/31/24 4090

## 2024-02-01 ENCOUNTER — HOSPITAL ENCOUNTER (EMERGENCY)
Facility: HOSPITAL | Age: 49
Discharge: HOME | End: 2024-02-01
Payer: COMMERCIAL

## 2024-02-01 VITALS
BODY MASS INDEX: 51.91 KG/M2 | OXYGEN SATURATION: 98 % | HEART RATE: 76 BPM | SYSTOLIC BLOOD PRESSURE: 112 MMHG | RESPIRATION RATE: 13 BRPM | TEMPERATURE: 98.3 F | WEIGHT: 293 LBS | DIASTOLIC BLOOD PRESSURE: 81 MMHG | HEIGHT: 63 IN

## 2024-02-01 DIAGNOSIS — L08.9 FINGER INFECTION: Primary | ICD-10-CM

## 2024-02-01 LAB
ALBUMIN SERPL BCP-MCNC: 3.7 G/DL (ref 3.4–5)
ALP SERPL-CCNC: 77 U/L (ref 33–110)
ALT SERPL W P-5'-P-CCNC: 11 U/L (ref 7–45)
ANION GAP SERPL CALC-SCNC: 10 MMOL/L (ref 10–20)
AST SERPL W P-5'-P-CCNC: 9 U/L (ref 9–39)
BASOPHILS # BLD AUTO: 0.11 X10*3/UL (ref 0–0.1)
BASOPHILS NFR BLD AUTO: 1.8 %
BILIRUB SERPL-MCNC: 0.3 MG/DL (ref 0–1.2)
BUN SERPL-MCNC: 14 MG/DL (ref 6–23)
CALCIUM SERPL-MCNC: 8.7 MG/DL (ref 8.6–10.3)
CHLORIDE SERPL-SCNC: 108 MMOL/L (ref 98–107)
CO2 SERPL-SCNC: 21 MMOL/L (ref 21–32)
CREAT SERPL-MCNC: 0.84 MG/DL (ref 0.5–1.05)
EGFRCR SERPLBLD CKD-EPI 2021: 86 ML/MIN/1.73M*2
EOSINOPHIL # BLD AUTO: 0.18 X10*3/UL (ref 0–0.7)
EOSINOPHIL NFR BLD AUTO: 2.9 %
ERYTHROCYTE [DISTWIDTH] IN BLOOD BY AUTOMATED COUNT: 13.9 % (ref 11.5–14.5)
GLUCOSE SERPL-MCNC: 95 MG/DL (ref 74–99)
HCT VFR BLD AUTO: 43.5 % (ref 36–46)
HGB BLD-MCNC: 14.6 G/DL (ref 12–16)
IMM GRANULOCYTES # BLD AUTO: 0.03 X10*3/UL (ref 0–0.7)
IMM GRANULOCYTES NFR BLD AUTO: 0.5 % (ref 0–0.9)
LYMPHOCYTES # BLD AUTO: 1.43 X10*3/UL (ref 1.2–4.8)
LYMPHOCYTES NFR BLD AUTO: 23.1 %
MCH RBC QN AUTO: 30.4 PG (ref 26–34)
MCHC RBC AUTO-ENTMCNC: 33.6 G/DL (ref 32–36)
MCV RBC AUTO: 90 FL (ref 80–100)
MONOCYTES # BLD AUTO: 0.52 X10*3/UL (ref 0.1–1)
MONOCYTES NFR BLD AUTO: 8.4 %
NEUTROPHILS # BLD AUTO: 3.91 X10*3/UL (ref 1.2–7.7)
NEUTROPHILS NFR BLD AUTO: 63.3 %
NRBC BLD-RTO: 0 /100 WBCS (ref 0–0)
PLATELET # BLD AUTO: 270 X10*3/UL (ref 150–450)
POTASSIUM SERPL-SCNC: 4.2 MMOL/L (ref 3.5–5.3)
PROT SERPL-MCNC: 6.4 G/DL (ref 6.4–8.2)
RBC # BLD AUTO: 4.81 X10*6/UL (ref 4–5.2)
SODIUM SERPL-SCNC: 135 MMOL/L (ref 136–145)
WBC # BLD AUTO: 6.2 X10*3/UL (ref 4.4–11.3)

## 2024-02-01 PROCEDURE — 36415 COLL VENOUS BLD VENIPUNCTURE: CPT | Performed by: NURSE PRACTITIONER

## 2024-02-01 PROCEDURE — 96374 THER/PROPH/DIAG INJ IV PUSH: CPT

## 2024-02-01 PROCEDURE — 26010 DRAINAGE OF FINGER ABSCESS: CPT | Mod: F1

## 2024-02-01 PROCEDURE — 80053 COMPREHEN METABOLIC PANEL: CPT | Performed by: NURSE PRACTITIONER

## 2024-02-01 PROCEDURE — 99284 EMERGENCY DEPT VISIT MOD MDM: CPT

## 2024-02-01 PROCEDURE — 85025 COMPLETE CBC W/AUTO DIFF WBC: CPT | Performed by: NURSE PRACTITIONER

## 2024-02-01 PROCEDURE — 2500000004 HC RX 250 GENERAL PHARMACY W/ HCPCS (ALT 636 FOR OP/ED): Performed by: NURSE PRACTITIONER

## 2024-02-01 RX ORDER — LIDOCAINE HYDROCHLORIDE 10 MG/ML
5 INJECTION INFILTRATION; PERINEURAL ONCE
Status: DISCONTINUED | OUTPATIENT
Start: 2024-02-01 | End: 2024-02-01 | Stop reason: HOSPADM

## 2024-02-01 RX ORDER — KETOROLAC TROMETHAMINE 30 MG/ML
30 INJECTION, SOLUTION INTRAMUSCULAR; INTRAVENOUS ONCE
Status: COMPLETED | OUTPATIENT
Start: 2024-02-01 | End: 2024-02-01

## 2024-02-01 RX ORDER — BACITRACIN ZINC 500 UNIT/G
OINTMENT IN PACKET (EA) TOPICAL 3 TIMES DAILY
Status: DISCONTINUED | OUTPATIENT
Start: 2024-02-01 | End: 2024-02-01 | Stop reason: HOSPADM

## 2024-02-01 RX ADMIN — KETOROLAC TROMETHAMINE 30 MG: 30 INJECTION, SOLUTION INTRAMUSCULAR at 11:29

## 2024-02-01 ASSESSMENT — COLUMBIA-SUICIDE SEVERITY RATING SCALE - C-SSRS
6. HAVE YOU EVER DONE ANYTHING, STARTED TO DO ANYTHING, OR PREPARED TO DO ANYTHING TO END YOUR LIFE?: NO
1. IN THE PAST MONTH, HAVE YOU WISHED YOU WERE DEAD OR WISHED YOU COULD GO TO SLEEP AND NOT WAKE UP?: NO
2. HAVE YOU ACTUALLY HAD ANY THOUGHTS OF KILLING YOURSELF?: NO

## 2024-02-01 ASSESSMENT — PAIN SCALES - GENERAL
PAINLEVEL_OUTOF10: 8
PAINLEVEL_OUTOF10: 10 - WORST POSSIBLE PAIN

## 2024-02-01 ASSESSMENT — PAIN - FUNCTIONAL ASSESSMENT
PAIN_FUNCTIONAL_ASSESSMENT: 0-10
PAIN_FUNCTIONAL_ASSESSMENT: 0-10

## 2024-02-01 ASSESSMENT — PAIN DESCRIPTION - LOCATION: LOCATION: HAND

## 2024-02-01 NOTE — Clinical Note
Yolanda Kumar was seen and treated in our emergency department on 2/1/2024.  She may return to work on 02/05/2024.       If you have any questions or concerns, please don't hesitate to call.      RUDI Yi-CNP

## 2024-02-01 NOTE — ED PROVIDER NOTES
HPI   Chief Complaint   Patient presents with   • Hand Pain     Seen yesterday, dx with hand infection and sent home. States not helping        This is a 48-year-old  female, with a past medical history of Erler's Danlos syndrome, hypertension, and fibromyalgia, migraine headaches, and obesity, that is presenting to the emergency room with complaints of persistent left second finger pain.  The patient was seen in the emergency room yesterday and diagnosed with cellulitis to the tip of the left second finger.  The patient was placed on Bactrim and Keflex.  She noted that a staple into the skin a couple of days ago and she developed redness to the area.  The patient was told to come to the emergency room if she had any chills or worsening symptomatology.  She states that she started to experience chills this morning.  She states that she has a throbbing pain to the extremity.  She reports that her finger is numb and except when you touch it and she has excruciating pain.  The patient has been taking her medication as directed.  She denies any documented fever.  She denies any recent injury.  She is not having any nausea or vomiting.  Patient reports that she had has not been able to sleep in the last 2 to 3 days due to pain.                          [place] Coma Scale Score: 15                  Patient History   Past Medical History:   Diagnosis Date   • Acquired clubfoot, right foot     Acquired bilateral club feet   • Essential (primary) hypertension 09/25/2020    HTN (hypertension), benign   • Fibromyalgia, primary    • Interstitial cystitis (chronic) without hematuria     IC (interstitial cystitis)   • Personal history of other diseases of the nervous system and sense organs     History of cataract   • Personal history of other diseases of the respiratory system     History of allergic rhinitis     Past Surgical History:   Procedure Laterality Date   • OTHER SURGICAL HISTORY  09/23/2020    Cholecystectomy    • OTHER SURGICAL HISTORY  09/23/2020    Tonsillectomy with adenoidectomy   • OTHER SURGICAL HISTORY  09/23/2020    Oophorectomy   • OTHER SURGICAL HISTORY  09/23/2020    Foot surgery   • OTHER SURGICAL HISTORY  09/23/2020    Cataract surgery   • OTHER SURGICAL HISTORY  02/10/2022    Hysterectomy     No family history on file.  Social History     Tobacco Use   • Smoking status: Every Day     Packs/day: 0.50     Years: 25.00     Additional pack years: 0.00     Total pack years: 12.50     Types: Cigarettes   • Smokeless tobacco: Never   Vaping Use   • Vaping Use: Never used   Substance Use Topics   • Alcohol use: Not on file   • Drug use: Never       Physical Exam   ED Triage Vitals [02/01/24 1101]   Temperature Heart Rate Respirations BP   36.8 °C (98.3 °F) 93 18 145/87      Pulse Ox Temp src Heart Rate Source Patient Position   98 % -- -- --      BP Location FiO2 (%)     -- --       Physical Exam  Vitals and nursing note reviewed.   HENT:      Right Ear: External ear normal.      Left Ear: External ear normal.      Nose: Nose normal.      Mouth/Throat:      Pharynx: Oropharynx is clear.   Eyes:      Conjunctiva/sclera: Conjunctivae normal.   Cardiovascular:      Rate and Rhythm: Normal rate and regular rhythm.   Pulmonary:      Effort: Pulmonary effort is normal.      Breath sounds: Normal breath sounds.   Musculoskeletal:         General: Swelling present. No deformity.      Cervical back: Normal range of motion.   Skin:     General: Skin is warm.      Capillary Refill: Capillary refill takes less than 2 seconds.      Comments: The patient has a small abscess noted to the distal portion of the left second distal phalanx with a mild amount of surrounding erythema.  No lymphangitic streaking.   Neurological:      General: No focal deficit present.      Mental Status: She is alert.   Psychiatric:         Mood and Affect: Mood normal.         ED Course & MDM   Diagnoses as of 02/20/24 1606   Finger infection        Medical Decision Making  Patient was seen and evaluated by the nurse practitioner, Jennifer Crowder.  Patient is presenting to the emergency room with complaints of worsening pain and swelling to the finger.  The previous provider was available to evaluate the finger.  She reported that there was decreased swelling and erythema noted however there does appear to be a small abscess at the tip of the finger now.  The patient was concern for worsening infection. A saline lock was established.  Laboratory studies were drawn with results as noted.  The patient did not have any acute leukocytosis.  She was administered Toradol 30 mg IVP.  The patient was prepared for incision and drainage.  The patient was covered in sterile drape.  The wound was cleaned 3 times with Shur-Clens.  Local anesthesia was achieved using  2  milliliters of 1% lidocaine without epinephrine.  A single stab like incision was made in the area of most fluctuance.  A large amount of purulent drainage was elicited.   The cavity was then irrigated with copious amounts of sterile saline.  A dry sterile dressing was applied. The patient was educated on abscess care and signs of worsening infection.  The patient is to follow-up with their primary care physician in next 2-3 days for wound evaluation.  The patient is to continue her Bactrim and Keflex at home.  They are to return if worse in any way.  The patient was discharged in stable condition with computer discharge instructions given.        Procedure  Procedures     MELISSA Yi  02/01/24 4702       MELISSA Yi  02/20/24 2412

## 2024-02-02 ENCOUNTER — TELEPHONE (OUTPATIENT)
Dept: PRIMARY CARE | Facility: CLINIC | Age: 49
End: 2024-02-02
Payer: COMMERCIAL

## 2024-02-02 DIAGNOSIS — T36.95XA ANTIBIOTIC-INDUCED YEAST INFECTION: Primary | ICD-10-CM

## 2024-02-02 DIAGNOSIS — B37.9 ANTIBIOTIC-INDUCED YEAST INFECTION: Primary | ICD-10-CM

## 2024-02-02 RX ORDER — FLUCONAZOLE 150 MG/1
150 TABLET ORAL ONCE
Qty: 1 TABLET | Refills: 0 | Status: SHIPPED | OUTPATIENT
Start: 2024-02-02 | End: 2024-02-08 | Stop reason: SDUPTHER

## 2024-02-02 NOTE — TELEPHONE ENCOUNTER
Medication needed to combat a yeast infection caused by yeast infection     Nov 2/21    [id number] - [place] - [address]  [address] [place] [address]  Phone: [phone]  Fax: [phone]

## 2024-02-07 ENCOUNTER — HOSPITAL ENCOUNTER (EMERGENCY)
Facility: HOSPITAL | Age: 49
Discharge: HOME | End: 2024-02-07
Attending: EMERGENCY MEDICINE
Payer: COMMERCIAL

## 2024-02-07 ENCOUNTER — TELEPHONE (OUTPATIENT)
Dept: PRIMARY CARE | Facility: CLINIC | Age: 49
End: 2024-02-07
Payer: COMMERCIAL

## 2024-02-07 VITALS
SYSTOLIC BLOOD PRESSURE: 148 MMHG | BODY MASS INDEX: 51.91 KG/M2 | HEIGHT: 63 IN | DIASTOLIC BLOOD PRESSURE: 93 MMHG | TEMPERATURE: 98 F | HEART RATE: 100 BPM | OXYGEN SATURATION: 97 % | WEIGHT: 293 LBS | RESPIRATION RATE: 20 BRPM

## 2024-02-07 DIAGNOSIS — L08.9 FINGER INFECTION: Primary | ICD-10-CM

## 2024-02-07 PROCEDURE — 99283 EMERGENCY DEPT VISIT LOW MDM: CPT | Performed by: EMERGENCY MEDICINE

## 2024-02-07 RX ORDER — CLINDAMYCIN HYDROCHLORIDE 150 MG/1
450 CAPSULE ORAL 3 TIMES DAILY
Qty: 90 CAPSULE | Refills: 0 | Status: SHIPPED | OUTPATIENT
Start: 2024-02-07 | End: 2024-02-21 | Stop reason: ALTCHOICE

## 2024-02-07 ASSESSMENT — LIFESTYLE VARIABLES
HAVE YOU EVER FELT YOU SHOULD CUT DOWN ON YOUR DRINKING: NO
EVER FELT BAD OR GUILTY ABOUT YOUR DRINKING: NO
EVER HAD A DRINK FIRST THING IN THE MORNING TO STEADY YOUR NERVES TO GET RID OF A HANGOVER: NO
HAVE PEOPLE ANNOYED YOU BY CRITICIZING YOUR DRINKING: NO

## 2024-02-07 ASSESSMENT — PAIN SCALES - GENERAL: PAINLEVEL_OUTOF10: 8

## 2024-02-07 ASSESSMENT — PAIN DESCRIPTION - ORIENTATION: ORIENTATION: LEFT

## 2024-02-07 ASSESSMENT — PAIN DESCRIPTION - PAIN TYPE: TYPE: ACUTE PAIN

## 2024-02-07 ASSESSMENT — PAIN DESCRIPTION - LOCATION: LOCATION: FINGER (COMMENT WHICH ONE)

## 2024-02-07 ASSESSMENT — PAIN - FUNCTIONAL ASSESSMENT: PAIN_FUNCTIONAL_ASSESSMENT: 0-10

## 2024-02-07 NOTE — ED PROVIDER NOTES
HPI   Chief Complaint   Patient presents with    Wound Infection     C/O left pointer finger infection that started a week ago.       Yolanda is a 48 year old female with a history of  Erler's Danlos syndrome, hypertension, and fibromyalgia, migraine headaches, and obesity.    She is here today with persistent finger pain after poking her L index finger with a staple under the nail. She was seen here at the ED 1/31 and 2/1 and received keflex, bactrim, and had an I&D to the tip of her finger.  She still complains of pain and a small pocket of pus under the nail. She was concerned about still having pain, called her PCP and they told her she may need a different antibiotic.     She has not experienced any fever, chills, nausea, vomiting, increased redness, red streaking or loss of ROM or sensation in the finger.      History provided by:  Patient   used: No                        No data recorded                   Patient History   Past Medical History:   Diagnosis Date    Acquired clubfoot, right foot     Acquired bilateral club feet    Essential (primary) hypertension 09/25/2020    HTN (hypertension), benign    Fibromyalgia, primary     Interstitial cystitis (chronic) without hematuria     IC (interstitial cystitis)    Personal history of other diseases of the nervous system and sense organs     History of cataract    Personal history of other diseases of the respiratory system     History of allergic rhinitis     Past Surgical History:   Procedure Laterality Date    OTHER SURGICAL HISTORY  09/23/2020    Cholecystectomy    OTHER SURGICAL HISTORY  09/23/2020    Tonsillectomy with adenoidectomy    OTHER SURGICAL HISTORY  09/23/2020    Oophorectomy    OTHER SURGICAL HISTORY  09/23/2020    Foot surgery    OTHER SURGICAL HISTORY  09/23/2020    Cataract surgery    OTHER SURGICAL HISTORY  02/10/2022    Hysterectomy     No family history on file.  Social History     Tobacco Use    Smoking status: Every  Day     Packs/day: 0.50     Years: 25.00     Additional pack years: 0.00     Total pack years: 12.50     Types: Cigarettes    Smokeless tobacco: Never   Vaping Use    Vaping Use: Never used   Substance Use Topics    Alcohol use: Not on file    Drug use: Never       Physical Exam   ED Triage Vitals [02/07/24 1634]   Temperature Heart Rate Respirations BP   36.7 °C (98 °F) 100 20 (!) 148/93      Pulse Ox Temp Source Heart Rate Source Patient Position   97 % Tympanic -- Sitting      BP Location FiO2 (%)     Left arm --       Physical Exam  Vitals and nursing note reviewed.   Constitutional:       Appearance: She is obese.   HENT:      Head: Normocephalic.   Eyes:      Extraocular Movements: Extraocular movements intact.      Conjunctiva/sclera: Conjunctivae normal.   Cardiovascular:      Rate and Rhythm: Normal rate and regular rhythm.   Pulmonary:      Effort: Pulmonary effort is normal.      Breath sounds: Normal breath sounds.   Musculoskeletal:      Right hand: Normal.      Left hand: Tenderness present. No swelling or deformity. Normal range of motion. Normal strength. Normal sensation. Normal pulse.      Comments: Tenderness of the distal left index finger   Skin:     General: Skin is warm and dry.      Capillary Refill: Capillary refill takes less than 2 seconds.      Findings: Abscess and signs of injury present.      Comments: Small pus pocket at the distal tip of the left index finger   Neurological:      General: No focal deficit present.      Mental Status: She is alert.   Psychiatric:         Mood and Affect: Mood normal.         ED Course & MDM   Diagnoses as of 02/07/24 1753   Finger infection       Medical Decision Making  The physician assistant was personally supervised by me, Jennifer Crowder CNP, during the physical examination.  I personally performed a physical exam and medical decision making.  I have made appropriate changes to the documentation and assessment and plan based on my verification,  exam, and medical decision making.    Patient was evaluated with the attending physician, Dr. Diaz.  The finger was reevaluated.  The finger demonstrated marked improvement.  The erythema and swelling had gone down dramatically.  The patient still had a small area at the tip of the finger along her artificial nail that was still very sensitive to touch.  There was not a large amount of fluctuance appreciated.  We do not feel that incision and drainage is warranted at this time.  The patient was placed on just a 7-day course of antibiotics.  We will change her antibiotic and place her on clindamycin for further evaluation.  She was advised to remove her artificial nails for better healing.  She is to continue her warm soaks.  The patient is to follow-up with the wound center for further treatment.  She was discharged in stable condition with computer discharge instructions given.  She is to return if worse in any way.    Jennifer Crowder CNP        Procedure  Procedures     RUDI Yi-AIDE  02/07/24 7252     03-May-2020 10:56

## 2024-02-07 NOTE — ED TRIAGE NOTES
Pt to ER with c/o left pointer finger still being infected after finishing course of Keflex and Bactrim oral therapy. Pt states finger still has purulent drainage, and her doctor sent her in for IV antibiotics.

## 2024-02-07 NOTE — TELEPHONE ENCOUNTER
She was seen in ER on 1-31 & 2-1 for cellulitis tip of L second finger--she has taken Keflex & Bactrim. She said swelling is down , but otherwise not any better. She does have drainage from area when she pushes on it.    She is former [facility] patient & sees you on 2-21-24.  Asking if she can get a different antibiotic ? / or should she go back to ER ?    [facility] [place]

## 2024-02-07 NOTE — TELEPHONE ENCOUNTER
Since she has already been on two different antibiotics and per ER record, required I&D, if no further improvement may require IV Antibiotics. Would return to ER.     Thank you!

## 2024-02-08 ENCOUNTER — TELEPHONE (OUTPATIENT)
Dept: PRIMARY CARE | Facility: CLINIC | Age: 49
End: 2024-02-08
Payer: COMMERCIAL

## 2024-02-08 DIAGNOSIS — T36.95XA ANTIBIOTIC-INDUCED YEAST INFECTION: ICD-10-CM

## 2024-02-08 DIAGNOSIS — B37.9 ANTIBIOTIC-INDUCED YEAST INFECTION: ICD-10-CM

## 2024-02-08 RX ORDER — FLUCONAZOLE 150 MG/1
150 TABLET ORAL ONCE
Qty: 1 TABLET | Refills: 0 | Status: SHIPPED | OUTPATIENT
Start: 2024-02-08 | End: 2024-02-08

## 2024-02-08 NOTE — TELEPHONE ENCOUNTER
Medication E-Prescribed.   
Patient notified   
Port Dimensions-X Axis In Cm: 2.3
Rx Refill Request Telephone Encounter    Name:  Yolanda Kumar  :  [id number]  Medication Name:  Diflucan  150 mg          Specific Pharmacy location:  [facility]        
Bill For Dosimetry/Render Treatment Time Calculation In Note: No
Number Of Treatment Days: 1
Time Dose Fractionation (Optional- Include Units If Applicable) Rx 2: 54
Dose / Tx In Cgy (Optional): 282.04
Field Size (Applicator): 4.0 cm
Custom Shielding Preamble Text Will Not Be Included With Simple Simulations (.......... X X Y Cm): A lead shield of 0.762 mm thickness is utilized to form a molded, custom shield with a
Fractions / Week: 3
Day Of The Week Treatment Administered: Tuesday
Field Size (Applicator): 3.0 cm
Fractions / Week Rx 4: 5
Total Number Of Fractions Rx 4: 15
Total Dose (Optional-Please Include Units): 2820.40 cGy
Functional Status: 0 (fully active)
Energy (Optional-Please Include Units): 100 kV
Treatment Device Design After Initial Simulation Justification (Will Render If Bill For Treatment Devices = Yes): The patient is status post radiation simulation and is evaluated as to the use of additional devices for shielding and placement for radiation therapy.
Treatment Time / Fractionation (Optional- Include Units): 0.44 min
Dimensions-Y Axis In Cm: 2.2
Validate Note Data (See Information Below): Yes
Total Number Of Fractions Rx 2: 11
Simple Simulation Preamble Text Will Be Included With Simple Simulations (.......... Indications): Simple simulation was performed today for the following reasons:
Time Dose Fractionation (Optional- Include Units If Applicable): 51
Assessment: Appropriate reaction
Port Dimensions-Y Axis In Cm: 3.6
Dimensions-X Axis In Cm: 0.9
Cumulative Dose In Cgy (Optional): 1128.16
Custom Shielding Afterword Text Will Not Be Included With Simple Simulations (X X Y Cm............): port to correlate with the lesion size, including treatment margin. The custom lead shield is adequate to accommodate the appropriate applicator and provide adequate shielding around the treatment site. Additional shielding (as noted below) is used to protect sensitive, normal tissues.
Daily Fractionated Dose (Optional- Include Units) Rx 2: 275.63 cGy
Information: Selecting Yes will display possible errors in your note based on the variables you have selected. This validation is only offered as a suggestion for you. PLEASE NOTE THAT THE VALIDATION TEXT WILL BE REMOVED WHEN YOU FINALIZE YOUR NOTE. IF YOU WANT TO FAX A PRELIMINARY NOTE YOU WILL NEED TO TOGGLE THIS TO 'NO' IF YOU DO NOT WANT IT IN YOUR FAXED NOTE.
Depth (Optional-Please Include Units): 2.44 mm
Computed Treatment Time In Min (Will Render The Same As Calculated Treatment Time If Left Blank): 0.44
Fractionation Number: 4
Treatment Margins In Cm: 0.7
Additional Prescription Justification Text: If there is any interruption in treatment exceeding 5 days please see [place] and Dose Adjustment Calculation and complete treatment under Prescription 2, 3 or 4 as appropriate.
Total Dose (Optional-Please Include Units) Rx 2: 3031.93 cGy
Patient Positioning: Sitting
Total Number Of Fractions: 10
Shielding Size (Optional- Include Units): 2.3 x 3.6 cm
Simple Simulation Afterword Text Will Be Included With Simple Simulations (Indications............): The patient had a complete consultation regarding all applicable modalities for the treatment of their skin cancer and based on a variety of factors including the type of tumor, size, and location, the relevant medical history as well as local tissue factors, the functional status of the individual, the ability to perform necessary postoperative wound instructions and the need for simultaneous treatments as well as overall wound healing status, it was determined that the patient would begin radiation therapy treatment for skin cancer.  A full simulation and treatment device design was performed including the determination and formulation of appropriate simple and complex devices including lead shield of 0.762 mm thickness to form molded customized shielding to specifically correlate with the lesion size including treatment margin.  The custom lead shield is adequate to accommodate the appropriate applicator and provide adequate shielding around the treatment site.  The specific field applicator, shields, and devices both simple and complex as well as the specific patient setup is outlined below.  The patient was given a full consent for superficial radiation to both verbally and in writing and the full determination of patient's eligibility for treatment and selection is outlined on the patient eligibility and treatment selection form.  The specific superficial radiotherapy prescription was determined and was documented on the superficial radiotherapy prescription form.  A treatment calculation was also performed and documented on the treatment calculation form.  Based on the prescription, the patient was scheduled for a series of fractional treatments.
Intro Statement (Will Not Render If Left Blank): The patient has undergone superficial radiation therapy for skin cancer and presents for post SRT evaluation and management. Per protocol and as documented on the flow sheet, the patient was questioned as to subjective redness, pruritus, pain, drainage, fatigue, or any other symptoms. Objectively, the radiation area was evaluated with regards to erythema, atrophy, scale, crusting, erosion, ulceration, edema, purpura, tenderness, warmth, drainage, and any other findings. The plan was extensively reviewed including the dose, and dosing schedule. The simulation and clinical setup was also reviewed as was the external and any internal shields and based on this review the appropriateness and sufficiency of treatment was determined.
Daily Fractionated Dose (Optional- Include Units): 282.04 cGy
Detail Level: Detailed
Fractionation Number (Evaluation): 21
Treatment Time / Fractionation (Optional- Include Units) Rx 2: 0.43 min

## 2024-02-21 ENCOUNTER — OFFICE VISIT (OUTPATIENT)
Dept: PRIMARY CARE | Facility: CLINIC | Age: 49
End: 2024-02-21
Payer: COMMERCIAL

## 2024-02-21 VITALS
BODY MASS INDEX: 51.91 KG/M2 | HEIGHT: 63 IN | DIASTOLIC BLOOD PRESSURE: 70 MMHG | WEIGHT: 293 LBS | SYSTOLIC BLOOD PRESSURE: 110 MMHG | HEART RATE: 78 BPM

## 2024-02-21 DIAGNOSIS — R63.5 WEIGHT GAIN: ICD-10-CM

## 2024-02-21 DIAGNOSIS — R22.43 LOCALIZED SWELLING OF BOTH LOWER LEGS: ICD-10-CM

## 2024-02-21 DIAGNOSIS — R79.89 LOW VITAMIN D LEVEL: ICD-10-CM

## 2024-02-21 DIAGNOSIS — E88.819 INSULIN RESISTANCE: ICD-10-CM

## 2024-02-21 DIAGNOSIS — Z12.11 COLON CANCER SCREENING: ICD-10-CM

## 2024-02-21 DIAGNOSIS — G43.709 CHRONIC MIGRAINE WITHOUT AURA WITHOUT STATUS MIGRAINOSUS, NOT INTRACTABLE: ICD-10-CM

## 2024-02-21 DIAGNOSIS — Q79.60 EHLERS-DANLOS SYNDROME (HHS-HCC): ICD-10-CM

## 2024-02-21 DIAGNOSIS — E66.01 OBESITY, MORBID, BMI 50 OR HIGHER (MULTI): ICD-10-CM

## 2024-02-21 DIAGNOSIS — E78.2 MIXED HYPERLIPIDEMIA: ICD-10-CM

## 2024-02-21 DIAGNOSIS — E53.8 VITAMIN B12 DEFICIENCY: ICD-10-CM

## 2024-02-21 DIAGNOSIS — Z76.89 ENCOUNTER TO ESTABLISH CARE: Primary | ICD-10-CM

## 2024-02-21 DIAGNOSIS — J45.20 MILD INTERMITTENT ASTHMA WITHOUT COMPLICATION (HHS-HCC): ICD-10-CM

## 2024-02-21 PROCEDURE — 96372 THER/PROPH/DIAG INJ SC/IM: CPT | Performed by: CLINICAL NURSE SPECIALIST

## 2024-02-21 PROCEDURE — 99214 OFFICE O/P EST MOD 30 MIN: CPT | Performed by: CLINICAL NURSE SPECIALIST

## 2024-02-21 RX ORDER — CYANOCOBALAMIN 1000 UG/ML
1000 INJECTION, SOLUTION INTRAMUSCULAR; SUBCUTANEOUS ONCE
Status: COMPLETED | OUTPATIENT
Start: 2024-02-21 | End: 2024-02-21

## 2024-02-21 RX ORDER — BUPROPION HYDROCHLORIDE 75 MG/1
150 TABLET ORAL DAILY
Qty: 180 TABLET | Refills: 3 | Status: SHIPPED | OUTPATIENT
Start: 2024-02-21 | End: 2025-02-20

## 2024-02-21 RX ORDER — ERGOCALCIFEROL 1.25 MG/1
50000 CAPSULE ORAL
Qty: 12 CAPSULE | Refills: 3 | Status: SHIPPED | OUTPATIENT
Start: 2024-02-21 | End: 2024-05-22 | Stop reason: SDUPTHER

## 2024-02-21 RX ORDER — NALTREXONE HYDROCHLORIDE 50 MG/1
25 TABLET, FILM COATED ORAL DAILY
Qty: 45 TABLET | Refills: 3 | Status: SHIPPED | OUTPATIENT
Start: 2024-02-21 | End: 2025-02-20

## 2024-02-21 RX ORDER — TORSEMIDE 10 MG/1
10 TABLET ORAL DAILY
Qty: 90 TABLET | Refills: 3 | Status: SHIPPED | OUTPATIENT
Start: 2024-02-21 | End: 2025-02-20

## 2024-02-21 RX ADMIN — CYANOCOBALAMIN 1000 MCG: 1000 INJECTION, SOLUTION INTRAMUSCULAR; SUBCUTANEOUS at 15:08

## 2024-02-21 ASSESSMENT — ENCOUNTER SYMPTOMS
EYE PAIN: 0
LOSS OF SENSATION IN FEET: 0
HEADACHES: 0
BLOOD IN STOOL: 0
CONSTIPATION: 0
HEMATURIA: 0
POLYDIPSIA: 0
COUGH: 0
NAUSEA: 0
DYSURIA: 0
FEVER: 0
CHILLS: 0
BACK PAIN: 1
SORE THROAT: 0
CHEST TIGHTNESS: 0
CONFUSION: 0
MYALGIAS: 0
SHORTNESS OF BREATH: 0
PHOTOPHOBIA: 0
BRUISES/BLEEDS EASILY: 0
JOINT SWELLING: 0
UNEXPECTED WEIGHT CHANGE: 0
VOMITING: 0
DIZZINESS: 0
SEIZURES: 0
FLANK PAIN: 0
DIARRHEA: 0
APPETITE CHANGE: 0
NECK PAIN: 0
DEPRESSION: 0
WHEEZING: 0
PALPITATIONS: 0
ABDOMINAL PAIN: 0
OCCASIONAL FEELINGS OF UNSTEADINESS: 0
SLEEP DISTURBANCE: 0
ARTHRALGIAS: 0
TROUBLE SWALLOWING: 0
ACTIVITY CHANGE: 0
FATIGUE: 1
WOUND: 0

## 2024-02-21 ASSESSMENT — COLUMBIA-SUICIDE SEVERITY RATING SCALE - C-SSRS
1. IN THE PAST MONTH, HAVE YOU WISHED YOU WERE DEAD OR WISHED YOU COULD GO TO SLEEP AND NOT WAKE UP?: NO
6. HAVE YOU EVER DONE ANYTHING, STARTED TO DO ANYTHING, OR PREPARED TO DO ANYTHING TO END YOUR LIFE?: NO
2. HAVE YOU ACTUALLY HAD ANY THOUGHTS OF KILLING YOURSELF?: NO

## 2024-02-21 NOTE — PROGRESS NOTES
Subjective   Patient ID: Yolanda Kumar is a 48 y.o. female who presents for Follow-up (Transfer from /Follow up ).  HPI    New patient here today to establish care.  Transfer care from .   Has been stable on medications, feels well on current regimen.     Recently treated in the ER for infection. Started to improve after completing antibiotic therapy.     Follows with Dr. Aguilar for Rheumatology.     Review of Systems   Constitutional:  Positive for fatigue. Negative for activity change, appetite change, chills, fever and unexpected weight change.   HENT:  Negative for ear pain, hearing loss, nosebleeds, sore throat, tinnitus and trouble swallowing.    Eyes:  Negative for photophobia, pain and visual disturbance.   Respiratory:  Negative for cough, chest tightness, shortness of breath and wheezing.    Cardiovascular:  Negative for chest pain, palpitations and leg swelling.   Gastrointestinal:  Negative for abdominal pain, blood in stool, constipation, diarrhea, nausea and vomiting.   Endocrine: Negative for cold intolerance, heat intolerance, polydipsia and polyuria.   Genitourinary:  Negative for dysuria, flank pain and hematuria.   Musculoskeletal:  Positive for back pain. Negative for arthralgias, joint swelling, myalgias and neck pain.   Skin:  Negative for pallor, rash and wound.   Allergic/Immunologic: Negative for immunocompromised state.   Neurological:  Negative for dizziness, seizures and headaches.   Hematological:  Does not bruise/bleed easily.   Psychiatric/Behavioral:  Negative for confusion and sleep disturbance.        Objective   Physical Exam  Vitals and nursing note reviewed.   Constitutional:       General: She is not in acute distress.     Appearance: Normal appearance.   HENT:      Head: Normocephalic.      Nose: Nose normal.   Eyes:      Conjunctiva/sclera: Conjunctivae normal.   Neck:      Vascular: No carotid bruit.   Cardiovascular:      Rate and Rhythm: Normal rate and regular  rhythm.      Pulses: Normal pulses.      Heart sounds: Normal heart sounds.   Pulmonary:      Effort: Pulmonary effort is normal.      Breath sounds: Normal breath sounds.   Abdominal:      General: Bowel sounds are normal.      Palpations: Abdomen is soft.   Musculoskeletal:         General: Normal range of motion.      Cervical back: Normal range of motion.   Skin:     General: Skin is warm and dry.   Neurological:      Mental Status: She is alert and oriented to person, place, and time. Mental status is at baseline.   Psychiatric:         Mood and Affect: Mood normal.         Behavior: Behavior normal.         Assessment/Plan       New order for blood work provided at  today.      Vitamin B12 Deficiency: Vitamin B12 Injections. Reevaluate with next lab work.   Asthma: Respiratory status at baseline. Continue to monitor.   Mirela-Danlos, Fibromyalgia: Continue to follow with Rheumatology as previously determined.   Chronic Migraine: Continue medication management.   Hyperlipidemia: Updated lab work ordered.   Vitamin D Deficiency: Vitamin D. [place] with next lab work.   Insulin Resistance: Updated lab work ordered.   Obesity: BMI: 55.80. Lifestyle changes recommended: Diet consisting of low fat foods, lean meats, high fiber, fresh fruits and vegetables. 150 min/ weekly aerobic exercise.     Mammogram: October 2023, negative per patient through [employer].   Cologuard ordered.   Declined updated immunizations.     Ivonne Mars, RUDI-CNS 02/21/24 2:47 PM

## 2024-03-21 ENCOUNTER — CLINICAL SUPPORT (OUTPATIENT)
Dept: PRIMARY CARE | Facility: CLINIC | Age: 49
End: 2024-03-21
Payer: COMMERCIAL

## 2024-03-21 DIAGNOSIS — E53.8 VITAMIN B12 DEFICIENCY: ICD-10-CM

## 2024-03-21 PROCEDURE — 96372 THER/PROPH/DIAG INJ SC/IM: CPT | Performed by: CLINICAL NURSE SPECIALIST

## 2024-03-21 RX ORDER — CYANOCOBALAMIN 1000 UG/ML
1000 INJECTION, SOLUTION INTRAMUSCULAR; SUBCUTANEOUS ONCE
Status: COMPLETED | OUTPATIENT
Start: 2024-03-21 | End: 2024-03-21

## 2024-03-21 RX ADMIN — CYANOCOBALAMIN 1000 MCG: 1000 INJECTION, SOLUTION INTRAMUSCULAR; SUBCUTANEOUS at 13:43

## 2024-04-23 ENCOUNTER — CLINICAL SUPPORT (OUTPATIENT)
Dept: PRIMARY CARE | Facility: CLINIC | Age: 49
End: 2024-04-23
Payer: COMMERCIAL

## 2024-04-23 DIAGNOSIS — E53.8 VITAMIN B12 DEFICIENCY: ICD-10-CM

## 2024-04-23 PROCEDURE — 96372 THER/PROPH/DIAG INJ SC/IM: CPT | Performed by: CLINICAL NURSE SPECIALIST

## 2024-04-23 RX ORDER — CYANOCOBALAMIN 1000 UG/ML
1000 INJECTION, SOLUTION INTRAMUSCULAR; SUBCUTANEOUS ONCE
Status: COMPLETED | OUTPATIENT
Start: 2024-04-23 | End: 2024-04-23

## 2024-04-23 RX ADMIN — CYANOCOBALAMIN 1000 MCG: 1000 INJECTION, SOLUTION INTRAMUSCULAR; SUBCUTANEOUS at 14:29

## 2024-04-23 NOTE — PROGRESS NOTES
Subjective   Patient ID: Yolanda Kumar is a 48 y.o. female who presents for Nurse Visit (B12 injection).    HPI     Review of Systems    Objective   There were no vitals taken for this visit.    Physical Exam    Assessment/Plan           no

## 2024-05-16 ENCOUNTER — LAB (OUTPATIENT)
Dept: LAB | Facility: LAB | Age: 49
End: 2024-05-16
Payer: COMMERCIAL

## 2024-05-16 DIAGNOSIS — G43.709 CHRONIC MIGRAINE WITHOUT AURA WITHOUT STATUS MIGRAINOSUS, NOT INTRACTABLE: ICD-10-CM

## 2024-05-16 DIAGNOSIS — E53.8 VITAMIN B12 DEFICIENCY: ICD-10-CM

## 2024-05-16 DIAGNOSIS — R63.5 WEIGHT GAIN: ICD-10-CM

## 2024-05-16 DIAGNOSIS — Z76.89 ENCOUNTER TO ESTABLISH CARE: ICD-10-CM

## 2024-05-16 DIAGNOSIS — R22.43 LOCALIZED SWELLING OF BOTH LOWER LEGS: ICD-10-CM

## 2024-05-16 DIAGNOSIS — Q79.60 EHLERS-DANLOS SYNDROME (HHS-HCC): ICD-10-CM

## 2024-05-16 DIAGNOSIS — E88.819 INSULIN RESISTANCE: ICD-10-CM

## 2024-05-16 DIAGNOSIS — R79.89 LOW VITAMIN D LEVEL: ICD-10-CM

## 2024-05-16 LAB
25(OH)D3 SERPL-MCNC: 7 NG/ML (ref 30–100)
ALBUMIN SERPL BCP-MCNC: 3.9 G/DL (ref 3.4–5)
ALP SERPL-CCNC: 69 U/L (ref 33–110)
ALT SERPL W P-5'-P-CCNC: 12 U/L (ref 7–45)
ANION GAP SERPL CALC-SCNC: 8 MMOL/L (ref 10–20)
AST SERPL W P-5'-P-CCNC: 10 U/L (ref 9–39)
BILIRUB SERPL-MCNC: 0.3 MG/DL (ref 0–1.2)
BUN SERPL-MCNC: 17 MG/DL (ref 6–23)
CALCIUM SERPL-MCNC: 8.8 MG/DL (ref 8.6–10.3)
CHLORIDE SERPL-SCNC: 111 MMOL/L (ref 98–107)
CHOLEST SERPL-MCNC: 207 MG/DL (ref 0–199)
CHOLESTEROL/HDL RATIO: 4.8
CO2 SERPL-SCNC: 22 MMOL/L (ref 21–32)
CREAT SERPL-MCNC: 0.65 MG/DL (ref 0.5–1.05)
EGFRCR SERPLBLD CKD-EPI 2021: >90 ML/MIN/1.73M*2
ERYTHROCYTE [DISTWIDTH] IN BLOOD BY AUTOMATED COUNT: 13.8 % (ref 11.5–14.5)
EST. AVERAGE GLUCOSE BLD GHB EST-MCNC: 108 MG/DL
GLUCOSE SERPL-MCNC: 96 MG/DL (ref 74–99)
HBA1C MFR BLD: 5.4 %
HCT VFR BLD AUTO: 44.9 % (ref 36–46)
HDLC SERPL-MCNC: 42.9 MG/DL
HGB BLD-MCNC: 14.8 G/DL (ref 12–16)
INSULIN P FAST SERPL-ACNC: 17 UIU/ML (ref 3–25)
LDLC SERPL CALC-MCNC: 146 MG/DL
MCH RBC QN AUTO: 30.1 PG (ref 26–34)
MCHC RBC AUTO-ENTMCNC: 33 G/DL (ref 32–36)
MCV RBC AUTO: 91 FL (ref 80–100)
NON HDL CHOLESTEROL: 164 MG/DL (ref 0–149)
NRBC BLD-RTO: 0 /100 WBCS (ref 0–0)
PLATELET # BLD AUTO: 316 X10*3/UL (ref 150–450)
POTASSIUM SERPL-SCNC: 4.2 MMOL/L (ref 3.5–5.3)
PROT SERPL-MCNC: 6.2 G/DL (ref 6.4–8.2)
RBC # BLD AUTO: 4.91 X10*6/UL (ref 4–5.2)
SODIUM SERPL-SCNC: 137 MMOL/L (ref 136–145)
TRIGL SERPL-MCNC: 90 MG/DL (ref 0–149)
TSH SERPL-ACNC: 2.07 MIU/L (ref 0.44–3.98)
VIT B12 SERPL-MCNC: 193 PG/ML (ref 211–911)
VLDL: 18 MG/DL (ref 0–40)
WBC # BLD AUTO: 6.6 X10*3/UL (ref 4.4–11.3)

## 2024-05-16 PROCEDURE — 80053 COMPREHEN METABOLIC PANEL: CPT

## 2024-05-16 PROCEDURE — 83525 ASSAY OF INSULIN: CPT

## 2024-05-16 PROCEDURE — 82306 VITAMIN D 25 HYDROXY: CPT

## 2024-05-16 PROCEDURE — 80061 LIPID PANEL: CPT

## 2024-05-16 PROCEDURE — 84443 ASSAY THYROID STIM HORMONE: CPT

## 2024-05-16 PROCEDURE — 82607 VITAMIN B-12: CPT

## 2024-05-16 PROCEDURE — 36415 COLL VENOUS BLD VENIPUNCTURE: CPT

## 2024-05-16 PROCEDURE — 83036 HEMOGLOBIN GLYCOSYLATED A1C: CPT

## 2024-05-16 PROCEDURE — 85027 COMPLETE CBC AUTOMATED: CPT

## 2024-05-22 ENCOUNTER — OFFICE VISIT (OUTPATIENT)
Dept: PRIMARY CARE | Facility: CLINIC | Age: 49
End: 2024-05-22
Payer: COMMERCIAL

## 2024-05-22 VITALS
DIASTOLIC BLOOD PRESSURE: 76 MMHG | HEART RATE: 81 BPM | OXYGEN SATURATION: 96 % | HEIGHT: 63 IN | BODY MASS INDEX: 51.91 KG/M2 | WEIGHT: 293 LBS | SYSTOLIC BLOOD PRESSURE: 122 MMHG

## 2024-05-22 DIAGNOSIS — Z12.11 COLON CANCER SCREENING: ICD-10-CM

## 2024-05-22 DIAGNOSIS — R22.43 LOCALIZED SWELLING OF BOTH LOWER LEGS: ICD-10-CM

## 2024-05-22 DIAGNOSIS — E88.819 INSULIN RESISTANCE: ICD-10-CM

## 2024-05-22 DIAGNOSIS — Z76.89 ENCOUNTER TO ESTABLISH CARE: ICD-10-CM

## 2024-05-22 DIAGNOSIS — R63.5 WEIGHT GAIN: ICD-10-CM

## 2024-05-22 DIAGNOSIS — R79.89 LOW VITAMIN D LEVEL: ICD-10-CM

## 2024-05-22 DIAGNOSIS — G43.709 CHRONIC MIGRAINE WITHOUT AURA WITHOUT STATUS MIGRAINOSUS, NOT INTRACTABLE: ICD-10-CM

## 2024-05-22 DIAGNOSIS — Q79.60 EHLERS-DANLOS SYNDROME (HHS-HCC): ICD-10-CM

## 2024-05-22 DIAGNOSIS — E53.8 VITAMIN B12 DEFICIENCY: ICD-10-CM

## 2024-05-22 PROCEDURE — 96372 THER/PROPH/DIAG INJ SC/IM: CPT | Performed by: CLINICAL NURSE SPECIALIST

## 2024-05-22 PROCEDURE — 99214 OFFICE O/P EST MOD 30 MIN: CPT | Performed by: CLINICAL NURSE SPECIALIST

## 2024-05-22 PROCEDURE — 4004F PT TOBACCO SCREEN RCVD TLK: CPT | Performed by: CLINICAL NURSE SPECIALIST

## 2024-05-22 RX ORDER — CYANOCOBALAMIN 1000 UG/ML
1000 INJECTION, SOLUTION INTRAMUSCULAR; SUBCUTANEOUS ONCE
Status: COMPLETED | OUTPATIENT
Start: 2024-05-22 | End: 2024-05-22

## 2024-05-22 RX ORDER — CYANOCOBALAMIN 1000 UG/ML
1000 INJECTION, SOLUTION INTRAMUSCULAR; SUBCUTANEOUS
Qty: 1 ML | Refills: 11 | Status: SHIPPED | OUTPATIENT
Start: 2024-05-22

## 2024-05-22 RX ORDER — VITAMIN B COMPLEX
1 TABLET ORAL DAILY
Qty: 90 TABLET | Refills: 3 | Status: SHIPPED | OUTPATIENT
Start: 2024-05-22 | End: 2025-05-22

## 2024-05-22 RX ORDER — ERGOCALCIFEROL 1.25 MG/1
50000 CAPSULE ORAL 2 TIMES WEEKLY
Qty: 24 CAPSULE | Refills: 3 | Status: SHIPPED | OUTPATIENT
Start: 2024-05-23 | End: 2025-05-23

## 2024-05-22 RX ADMIN — CYANOCOBALAMIN 1000 MCG: 1000 INJECTION, SOLUTION INTRAMUSCULAR; SUBCUTANEOUS at 15:30

## 2024-05-22 ASSESSMENT — ENCOUNTER SYMPTOMS
DIARRHEA: 0
FEVER: 0
HEADACHES: 0
LOSS OF SENSATION IN FEET: 0
PHOTOPHOBIA: 0
VOMITING: 0
ARTHRALGIAS: 0
NECK PAIN: 0
POLYDIPSIA: 0
FATIGUE: 1
ACTIVITY CHANGE: 0
MYALGIAS: 0
BRUISES/BLEEDS EASILY: 0
WHEEZING: 0
CHILLS: 0
CHEST TIGHTNESS: 0
SLEEP DISTURBANCE: 0
ABDOMINAL PAIN: 0
BLOOD IN STOOL: 0
FLANK PAIN: 0
CONSTIPATION: 0
OCCASIONAL FEELINGS OF UNSTEADINESS: 0
DYSURIA: 0
SHORTNESS OF BREATH: 0
SEIZURES: 0
SORE THROAT: 0
EYE PAIN: 0
WOUND: 0
CONFUSION: 0
JOINT SWELLING: 0
HEMATURIA: 0
TROUBLE SWALLOWING: 0
DEPRESSION: 0
BACK PAIN: 0
UNEXPECTED WEIGHT CHANGE: 0
COUGH: 0
DIZZINESS: 0
PALPITATIONS: 0
NAUSEA: 0
APPETITE CHANGE: 0

## 2024-05-22 NOTE — PROGRESS NOTES
Is it the same pain as previous or any different? Per previous plan would increase to bid PPI and in not improving after that add sucralfate in 1-2 weeks   Subjective   Patient ID: Yolanda Kumar is a 48 y.o. female who presents for Follow-up (3 month f/u).  HPI    Here today as a follow up appointment. Discuss results of blood work.   Has been stable on medications, feels well on current regimen.      Follows with Dr. Aguilar for Rheumatology.     Review of Systems   Constitutional:  Positive for fatigue. Negative for activity change, appetite change, chills, fever and unexpected weight change.   HENT:  Negative for ear pain, hearing loss, nosebleeds, sore throat, tinnitus and trouble swallowing.    Eyes:  Negative for photophobia, pain and visual disturbance.   Respiratory:  Negative for cough, chest tightness, shortness of breath and wheezing.    Cardiovascular:  Negative for chest pain, palpitations and leg swelling.   Gastrointestinal:  Negative for abdominal pain, blood in stool, constipation, diarrhea, nausea and vomiting.   Endocrine: Negative for cold intolerance, heat intolerance, polydipsia and polyuria.   Genitourinary:  Negative for dysuria, flank pain and hematuria.   Musculoskeletal:  Negative for arthralgias, back pain, joint swelling, myalgias and neck pain.   Skin:  Negative for pallor, rash and wound.   Allergic/Immunologic: Negative for immunocompromised state.   Neurological:  Negative for dizziness, seizures and headaches.   Hematological:  Does not bruise/bleed easily.   Psychiatric/Behavioral:  Negative for confusion and sleep disturbance.        Objective   Physical Exam  Vitals and nursing note reviewed.   Constitutional:       General: She is not in acute distress.     Appearance: Normal appearance. She is obese.   HENT:      Head: Normocephalic.      Nose: Nose normal.   Eyes:      Conjunctiva/sclera: Conjunctivae normal.   Neck:      Vascular: No carotid bruit.   Cardiovascular:      Rate and Rhythm: Normal rate and regular rhythm.      Pulses: Normal pulses.      Heart sounds: Normal heart sounds.   Pulmonary:      Effort: Pulmonary effort  is normal.      Breath sounds: Normal breath sounds.   Abdominal:      General: Bowel sounds are normal.      Palpations: Abdomen is soft.   Musculoskeletal:         General: Normal range of motion.      Cervical back: Normal range of motion.   Skin:     General: Skin is warm and dry.   Neurological:      Mental Status: She is alert and oriented to person, place, and time. Mental status is at baseline.   Psychiatric:         Mood and Affect: Mood normal.         Behavior: Behavior normal.         Assessment/Plan     Reviewed results of blood work completed with patient.       Vitamin B12 Deficiency: Vitamin B12 Injections with oral Vitamin B12. Reevaluate with next lab work.   Asthma: Respiratory status at baseline. Continue to monitor.   Mirela-Danlos, Fibromyalgia: Continue to follow with Rheumatology as previously determined.   Chronic Migraine: Continue medication management.   Hyperlipidemia: Updated lab work ordered.   Vitamin D Deficiency: Vitamin D. Reevalaute with next lab work.   Insulin Resistance: Updated lab work ordered.   Obesity: BMI: 55.64. Lifestyle changes recommended: Diet consisting of low fat foods, lean meats, high fiber, fresh fruits and vegetables. 150 min/ weekly aerobic exercise. Declined Bariatrics referral.      Mammogram: October 2023, negative per patient through [employer].   Colonoscopy ordered.   Declined updated immunizations.        Ivonne Mars, RUDI-CNS 05/22/24 3:06 PM

## 2024-05-23 ENCOUNTER — PATIENT MESSAGE (OUTPATIENT)
Dept: PRIMARY CARE | Facility: CLINIC | Age: 49
End: 2024-05-23
Payer: COMMERCIAL

## 2024-05-23 ENCOUNTER — TELEPHONE (OUTPATIENT)
Dept: PRIMARY CARE | Facility: CLINIC | Age: 49
End: 2024-05-23
Payer: COMMERCIAL

## 2024-05-23 DIAGNOSIS — E53.8 VITAMIN B12 DEFICIENCY: ICD-10-CM

## 2024-05-23 NOTE — TELEPHONE ENCOUNTER
Per message: Pharmacy doesn't have 20 gauge in stock. Can you resend with either 21 or 22 gauge.     Can you repend?     Thank you!

## 2024-05-24 DIAGNOSIS — E53.8 VITAMIN B12 DEFICIENCY: ICD-10-CM

## 2024-05-24 NOTE — TELEPHONE ENCOUNTER
"Needs script altered for   syringe with needle 3 mL 21 gauge x 1\" syringe [[id number]]     Altered to 1.5\" needle     [facility] - [place] - [address]  [address] [place] [address]  Phone: [phone]  Fax: [phone]  LUIS ANTONIO #: [id number]       "

## 2024-05-28 ENCOUNTER — TELEPHONE (OUTPATIENT)
Dept: PRIMARY CARE | Facility: CLINIC | Age: 49
End: 2024-05-28
Payer: COMMERCIAL

## 2024-05-28 NOTE — TELEPHONE ENCOUNTER
Pharmacy called wanted to see if the script for the syringe can be changed to  21 gauge/1.5 inch/ 3ml

## 2024-06-19 ENCOUNTER — TELEPHONE (OUTPATIENT)
Dept: GASTROENTEROLOGY | Facility: CLINIC | Age: 49
End: 2024-06-19
Payer: COMMERCIAL

## 2024-06-19 NOTE — TELEPHONE ENCOUNTER
----- Message from Noel Redmond sent at 6/19/2024 10:46 AM EDT -----  Regarding: FW: OFFICE VISIT  Patient scheduled for 8/20/24 with dr. burton  ----- Message -----  From: Beth Robbins RN  Sent: 6/17/2024   3:47 PM EDT  To: [id number] Clerical  Subject: OFFICE VISIT                                     [facility] GI BMI per chart review

## 2024-08-20 ENCOUNTER — APPOINTMENT (OUTPATIENT)
Dept: GASTROENTEROLOGY | Facility: CLINIC | Age: 49
End: 2024-08-20
Payer: COMMERCIAL

## 2024-08-20 VITALS
OXYGEN SATURATION: 99 % | SYSTOLIC BLOOD PRESSURE: 124 MMHG | WEIGHT: 293 LBS | HEART RATE: 88 BPM | BODY MASS INDEX: 51.91 KG/M2 | DIASTOLIC BLOOD PRESSURE: 72 MMHG | HEIGHT: 63 IN

## 2024-08-20 DIAGNOSIS — Z12.11 SCREENING FOR COLON CANCER: Primary | ICD-10-CM

## 2024-08-20 DIAGNOSIS — R19.7 DIARRHEA, UNSPECIFIED TYPE: ICD-10-CM

## 2024-08-20 PROBLEM — R73.9 HYPERGLYCEMIA: Status: ACTIVE | Noted: 2024-08-20

## 2024-08-20 PROBLEM — M21.541 ACQUIRED EQUINOVARUS DEFORMITY OF BOTH FEET: Status: ACTIVE | Noted: 2024-08-20

## 2024-08-20 PROBLEM — N30.10 CHRONIC INTERSTITIAL CYSTITIS: Status: ACTIVE | Noted: 2024-08-20

## 2024-08-20 PROBLEM — M25.579 ANKLE PAIN: Status: ACTIVE | Noted: 2023-08-24

## 2024-08-20 PROBLEM — M21.542 ACQUIRED EQUINOVARUS DEFORMITY OF BOTH FEET: Status: ACTIVE | Noted: 2024-08-20

## 2024-08-20 PROBLEM — M25.569 KNEE PAIN: Status: ACTIVE | Noted: 2023-04-13

## 2024-08-20 PROCEDURE — 3008F BODY MASS INDEX DOCD: CPT | Performed by: INTERNAL MEDICINE

## 2024-08-20 PROCEDURE — 4004F PT TOBACCO SCREEN RCVD TLK: CPT | Performed by: INTERNAL MEDICINE

## 2024-08-20 PROCEDURE — 99203 OFFICE O/P NEW LOW 30 MIN: CPT | Performed by: INTERNAL MEDICINE

## 2024-08-20 RX ORDER — TOPIRAMATE 50 MG/1
1 TABLET, FILM COATED ORAL
COMMUNITY
Start: 2024-05-23

## 2024-08-20 RX ORDER — NAPROXEN 500 MG/1
500 TABLET ORAL
COMMUNITY

## 2024-08-20 ASSESSMENT — ENCOUNTER SYMPTOMS
ADENOPATHY: 0
SORE THROAT: 0
ARTHRALGIAS: 1
PALPITATIONS: 0
WEAKNESS: 0
CONFUSION: 0
CHILLS: 0
BRUISES/BLEEDS EASILY: 0
FEVER: 0
UNEXPECTED WEIGHT CHANGE: 1
VOICE CHANGE: 0
HEADACHES: 0
COUGH: 0
ROS GI COMMENTS: AS DETAILED ABOVE.
FATIGUE: 0
TROUBLE SWALLOWING: 0
HEMATURIA: 0
DYSURIA: 0
DIZZINESS: 0
SHORTNESS OF BREATH: 0
SEIZURES: 0
NUMBNESS: 0
NERVOUS/ANXIOUS: 0
MYALGIAS: 0
WHEEZING: 0

## 2024-08-20 NOTE — PATIENT INSTRUCTIONS
Check labs.        You have been scheduled for a colonoscopy.  You were given instructions for preparing for this test in the office today.  If you have questions about these instructions, please call my office at [phone].    After your procedure, you can expect me to talk to you to go over the results of the procedure. If polyps were removed I will usually be able to tell you my initial thoughts about those polyps and give you some idea of when you should have another colonoscopy.    If any polyps are removed during your procedure or if any biopsies are obtained those specimens will go to the pathologists to review under the microscope. Once those results are available they will be sent to me electronically to review. These results will also be available to you at that time through [employer]. I briefly review all of these results by the next business day to determine if there is any urgent information that needs to be communicated to you right away or anything that would significantly change what I told you at the time of the procedure. If there is nothing urgent this is usually a good sign and I will then do a more extensive review of the result and send you a letter with my final recommendations within a week of the result becoming available. If you have questions or need additional information I urge you to call the office at [phone], but I do ask for patience as I spend a good portion of each day performing procedures like the one you are scheduled for and during those times I am not able to take or return routine phone calls.    You were also given information regarding the schedule for your procedure including the time that you need to arrive to the endoscopy unit.  You will also be contacted 2-3 day prior to your procedure to confirm the final arrival time.  If you have questions about this or if you need to cancel or change this appointment please call my office at [phone].         Follow up  in 3-4 months to further discuss your diarrhea.

## 2024-08-20 NOTE — PROGRESS NOTES
[facility]    ASSESSMENT and PLAN:       Yolanda Kumar is a 49 y.o. female with a significant past medical history of HLD, obesity (BMI 56), fibromyalgia, migraines, Mirela-Danlos Syndrome, and asthma who presents for consultation requested by her primary care provider (REHANA Ramirez) to arrange for a colonoscopy.       Colon cancer screening  Due for screening colonoscopy. No previous difficulties with sedation.  No antiplatelet/antithrombotic use.  - colonoscopy scheduled in OR due to BMI  - OTC bowel prep discussed with patient  - instructions for procedure discussed with patient in the office today and hard copy given to patient today      Diarrhea  Incompletely evaluated today. Chronic symptom with reported work up in the past. Labs ordered. Will assess further at the time of her follow up.      Follow up in 3-4 months for further evaluation of diarrhea.          Saleem Xiao MD        Senior Attending Physician, Gastroenterology    [facility]    Clinical   [facility] [employer]        Subjective   HISTORY OF PRESENT ILLNESS:     Chief Complaint  Colonoscopy    History Of Present Illness:    oYlanda Kumar is a 49 y.o. female with a significant past medical history of HLD, obesity (BMI 56), fibromyalgia, migraines, Mirela-Danlos Syndrome, and asthma who presents for consultation requested by her primary care provider (REHANA Ramirez) to arrange for a colonoscopy.     She was seen by her PCP in May at which time a screening colonoscopy was ordered. No endoscopy reports in the EMR. Cologuard was previously ordered, but not done. Labs include a normal CBC.    She says that she did have a colonoscopy about 9-10 years ago that was normal. She says that colonoscopy was done to work up chronic diarrhea and she says that biopsies at that time that were also normal.  Her mother and father have had polyps in the past, but no family history of colon cancer.    She reports that she has had intermittent abdominal discomfort as well as chronic diarrhea which has been present for over 10 years. She also reports that she has some intermittent spots of blood in the stool that she has attributed to hemorrhoids. She says that her diarrhea was worked up in the past and she says that all testing at that time was normal. She has been gaining weight.      Patient denies any heartburn/GERD, N/V, dysphagia, odynophagia, constipation, hematemesis, melena, or weight loss.      Review of systems:   Review of Systems   Constitutional:  Positive for unexpected weight change (weight gain). Negative for chills, fatigue and fever.   HENT:  Negative for congestion, sneezing, sore throat, trouble swallowing and voice change.    Respiratory:  Negative for cough, shortness of breath and wheezing.    Cardiovascular:  Negative for chest pain, palpitations and leg swelling.   Gastrointestinal:         As detailed above.   Genitourinary:  Negative for dysuria and hematuria.   Musculoskeletal:  Positive for arthralgias. Negative for myalgias.   Skin:  Negative for pallor and rash.   Neurological:  Negative for dizziness, seizures, syncope, weakness, numbness and headaches.   Hematological:  Negative for adenopathy. Does not bruise/bleed easily.   Psychiatric/Behavioral:  Negative for confusion. The patient is not nervous/anxious.    All other systems reviewed and are negative.      I performed a complete 10 point review of systems and it is negative except as noted in HPI or above.      PAST HISTORIES:       Past Medical History:  Patient Active Problem List   Diagnosis    Acute pain of left knee    Anxiety    Back pain, chronic    Chronic headaches    Fibromyalgia    Mirela-Danlos syndrome ([facility]-HCC)    Hyperlipidemia    Insulin resistance    Low vitamin D level    Migraine headache    Mild intermittent asthma  "([facility])    Morbid obesity (Multi)    Candidiasis of vagina    Knee pain    Ankle pain    Hyperglycemia    Chronic interstitial cystitis    Acquired equinovarus deformity of both feet     She has a past medical history of Acquired clubfoot, right foot, Essential (primary) hypertension (09/25/2020), Fibromyalgia, primary, Interstitial cystitis (chronic) without hematuria, Personal history of other diseases of the nervous system and sense organs, and Personal history of other diseases of the respiratory system.    Past Surgical History:  She has a past surgical history that includes Other surgical history (09/23/2020); Other surgical history (09/23/2020); Other surgical history (09/23/2020); Other surgical history (09/23/2020); Other surgical history (09/23/2020); and Other surgical history (02/10/2022).      Social History:  She reports that she has been smoking cigarettes. She has a 12.5 pack-year smoking history. She has never used smokeless tobacco. She reports that she does not drink alcohol and does not use drugs.    Family History:  No known family history of GI disease, specifically denies any family history of pancreatitis, Crohn's, colon cancer, gastroesophageal cancer, or ulcerative colitis.    No family history on file.     Allergies:  Influenza virus vaccines      Objective   OBJECTIVE:       Last Recorded Vitals:  Vitals:    08/20/24 1507   BP: 124/72   Pulse: 88   SpO2: 99%   Weight: 145 kg (320 lb)   Height: 1.6 m (5' 3\")     /72   Pulse 88   Ht 1.6 m (5' 3\")   Wt 145 kg (320 lb)   SpO2 99%   BMI 56.69 kg/m²      Physical Exam:    Physical Exam  Vitals reviewed.   Constitutional:       General: She is not in acute distress.     Appearance: She is obese. She is not ill-appearing.   HENT:      Head: Normocephalic and atraumatic.   Eyes:      General: No scleral icterus.  Cardiovascular:      Rate and Rhythm: Normal rate and regular rhythm.      Pulses: Normal pulses.      Heart sounds: Normal " heart sounds. No murmur heard.  Pulmonary:      Effort: Pulmonary effort is normal. No respiratory distress.      Breath sounds: Normal breath sounds. No wheezing.   Abdominal:      General: Bowel sounds are normal.      Palpations: Abdomen is soft.      Tenderness: There is no abdominal tenderness. There is no rebound.   Musculoskeletal:         General: No swelling or deformity.   Skin:     General: Skin is warm and dry.      Coloration: Skin is not jaundiced.      Findings: No rash.   Neurological:      General: No focal deficit present.      Mental Status: She is alert and oriented to person, place, and time.   Psychiatric:         Mood and Affect: Mood normal.         Behavior: Behavior normal.         Thought Content: Thought content normal.         Judgment: Judgment normal.         Home Medications:  Prior to Admission medications    Medication Sig Start Date End Date Taking? Authorizing Provider   albuterol 90 mcg/actuation inhaler Inhale 2 puffs every 4 hours if needed. 9/25/20   Historical Provider, MD   amitriptyline (Elavil) 10 mg tablet Take 1 tablet (10 mg) by mouth once daily at bedtime.    Historical Provider, MD   buPROPion (Wellbutrin) 75 mg tablet Take 2 tablets (150 mg) by mouth once daily. 2/21/24 2/20/25  RUDI Ramirez-CNS   butalbital-acetaminophen-caff (Fioricet) -40 mg capsule Take 1 capsule by mouth once daily as needed for headaches. 7/5/23   RUDI Deleon-CNP   cholecalciferol (Vitamin D-3) 125 MCG (5000 UT) capsule Take 1 capsule (125 mcg) by mouth once daily.    Historical Provider, MD   cyanocobalamin (Vitamin B-12) 1,000 mcg/mL injection Inject 1 mL (1,000 mcg) into the muscle every 30 (thirty) days. 5/22/24   RUDI Ramirez-CNS   cyanocobalamin, vitamin B-12, (Vitamin B-12) 2,500 mcg tablet, sublingual SL tablet Place 1 tablet (2,500 mcg) under the tongue once daily. 5/22/24 5/22/25  RUDI Ramirez-CNS   cyclobenzaprine (Flexeril) 5 mg tablet Take 1  "tablet (5 mg) by mouth 2 times a day.    Historical Provider, MD   ergocalciferol (Vitamin D-2) 1.25 MG (61682 UT) capsule Take 1 capsule (50,000 Units) by mouth 2 times a week. 5/23/24 5/23/25  REHANA Ramirez   gabapentin (Neurontin) 400 mg capsule Take 1 capsule (400 mg) by mouth 4 times a day.    Historical Provider, MD   naltrexone (Depade) 50 mg tablet Take 0.5 tablets (25 mg) by mouth once daily. 2/21/24 2/20/25  REHANA Ramirez   rimegepant (Nurtec ODT) 75 mg tablet,disintegrating Take 1 tablet (75 mg) by mouth every other day. 2/21/24   REHANA Ramirez   syringe with needle 3 mL 21 gauge x 1\" syringe 1 each every 30 (thirty) days. Use for b12 injection 5/24/24   REHANA Ramirez   syringe with needle, safety 3 mL 20 gauge x 1 1/2\" syringe 1 each every 30 (thirty) days. Use for b12 injection 5/27/24   REHANA Ramirez   torsemide (Demadex) 10 mg tablet Take 1 tablet (10 mg) by mouth once daily. 2/21/24 2/20/25  REHANA Ramirez         Relevant Results Recent labs reviewed in the EMR.    Lab Results   Component Value Date/Time    WBC 6.6 05/16/2024 0924    HGB 14.8 05/16/2024 0924    HGB 14.6 02/01/2024 1126    HGB 15.0 08/24/2023 0919    HGB 15.1 04/15/2023 0734    HGB 14.8 03/05/2022 0902    MCV 91 05/16/2024 0924     05/16/2024 0924     02/01/2024 1126     08/24/2023 0919     04/15/2023 0734     03/05/2022 0902    [id number] 193 (L) 05/16/2024 0924       Lab Results   Component Value Date/Time     05/16/2024 0924    K 4.2 05/16/2024 0924     (H) 05/16/2024 0924    BUN 17 05/16/2024 0924    CREATININE 0.65 05/16/2024 0924    CREATININE 0.84 02/01/2024 1126       Lab Results   Component Value Date/Time    BILITOT 0.3 05/16/2024 0924    BILITOT 0.3 02/01/2024 1126    BILITOT 0.4 04/15/2023 0734    BILITOT 0.3 03/05/2022 0902    ALKPHOS 69 05/16/2024 0924    ALKPHOS 77 02/01/2024 1126    ALKPHOS 74 04/15/2023 " 0734    ALKPHOS 67 03/05/2022 0902    AST 10 05/16/2024 0924    AST 9 02/01/2024 1126    AST 14 04/15/2023 0734    AST 11 03/05/2022 0902    ALT 12 05/16/2024 0924    ALT 11 02/01/2024 1126    ALT 14 04/15/2023 0734    ALT 13 03/05/2022 0902       Lab Results   Component Value Date/Time    CRP 0.97 08/24/2023 0919       Radiology: Imaging reviewed in the EMR.  No results found.

## 2024-08-26 ENCOUNTER — APPOINTMENT (OUTPATIENT)
Dept: PRIMARY CARE | Facility: CLINIC | Age: 49
End: 2024-08-26
Payer: COMMERCIAL

## 2024-08-27 ENCOUNTER — LAB (OUTPATIENT)
Dept: LAB | Facility: LAB | Age: 49
End: 2024-08-27
Payer: COMMERCIAL

## 2024-08-27 DIAGNOSIS — R19.7 DIARRHEA, UNSPECIFIED TYPE: ICD-10-CM

## 2024-08-27 DIAGNOSIS — E88.819 INSULIN RESISTANCE: ICD-10-CM

## 2024-08-27 DIAGNOSIS — G43.709 CHRONIC MIGRAINE WITHOUT AURA WITHOUT STATUS MIGRAINOSUS, NOT INTRACTABLE: ICD-10-CM

## 2024-08-27 DIAGNOSIS — R22.43 LOCALIZED SWELLING OF BOTH LOWER LEGS: ICD-10-CM

## 2024-08-27 DIAGNOSIS — Z76.89 ENCOUNTER TO ESTABLISH CARE: ICD-10-CM

## 2024-08-27 DIAGNOSIS — Q79.60 EHLERS-DANLOS SYNDROME (HHS-HCC): ICD-10-CM

## 2024-08-27 DIAGNOSIS — E53.8 VITAMIN B12 DEFICIENCY: ICD-10-CM

## 2024-08-27 DIAGNOSIS — R63.5 WEIGHT GAIN: ICD-10-CM

## 2024-08-27 DIAGNOSIS — R79.89 LOW VITAMIN D LEVEL: ICD-10-CM

## 2024-08-27 DIAGNOSIS — Z12.11 COLON CANCER SCREENING: ICD-10-CM

## 2024-08-27 LAB
25(OH)D3 SERPL-MCNC: 19 NG/ML (ref 30–100)
ALBUMIN SERPL BCP-MCNC: 4 G/DL (ref 3.4–5)
ALP SERPL-CCNC: 67 U/L (ref 33–110)
ALT SERPL W P-5'-P-CCNC: 13 U/L (ref 7–45)
ANION GAP SERPL CALC-SCNC: 9 MMOL/L (ref 10–20)
AST SERPL W P-5'-P-CCNC: 11 U/L (ref 9–39)
BILIRUB SERPL-MCNC: 0.3 MG/DL (ref 0–1.2)
BUN SERPL-MCNC: 16 MG/DL (ref 6–23)
CALCIUM SERPL-MCNC: 8.6 MG/DL (ref 8.6–10.3)
CHLORIDE SERPL-SCNC: 107 MMOL/L (ref 98–107)
CO2 SERPL-SCNC: 23 MMOL/L (ref 21–32)
CREAT SERPL-MCNC: 0.65 MG/DL (ref 0.5–1.05)
CRP SERPL-MCNC: 0.32 MG/DL
EGFRCR SERPLBLD CKD-EPI 2021: >90 ML/MIN/1.73M*2
GLUCOSE SERPL-MCNC: 92 MG/DL (ref 74–99)
POTASSIUM SERPL-SCNC: 4.4 MMOL/L (ref 3.5–5.3)
PROT SERPL-MCNC: 6.3 G/DL (ref 6.4–8.2)
SODIUM SERPL-SCNC: 135 MMOL/L (ref 136–145)
VIT B12 SERPL-MCNC: 282 PG/ML (ref 211–911)

## 2024-08-27 PROCEDURE — 83516 IMMUNOASSAY NONANTIBODY: CPT

## 2024-08-27 PROCEDURE — 80053 COMPREHEN METABOLIC PANEL: CPT

## 2024-08-27 PROCEDURE — 86803 HEPATITIS C AB TEST: CPT

## 2024-08-27 PROCEDURE — 82607 VITAMIN B-12: CPT

## 2024-08-27 PROCEDURE — 36415 COLL VENOUS BLD VENIPUNCTURE: CPT

## 2024-08-27 PROCEDURE — 82306 VITAMIN D 25 HYDROXY: CPT

## 2024-08-27 PROCEDURE — 86140 C-REACTIVE PROTEIN: CPT

## 2024-08-28 ENCOUNTER — APPOINTMENT (OUTPATIENT)
Dept: PRIMARY CARE | Facility: CLINIC | Age: 49
End: 2024-08-28
Payer: COMMERCIAL

## 2024-08-28 VITALS
HEIGHT: 63 IN | WEIGHT: 293 LBS | BODY MASS INDEX: 51.91 KG/M2 | HEART RATE: 96 BPM | SYSTOLIC BLOOD PRESSURE: 120 MMHG | DIASTOLIC BLOOD PRESSURE: 72 MMHG

## 2024-08-28 DIAGNOSIS — Z12.31 VISIT FOR SCREENING MAMMOGRAM: ICD-10-CM

## 2024-08-28 DIAGNOSIS — G43.709 CHRONIC MIGRAINE WITHOUT AURA WITHOUT STATUS MIGRAINOSUS, NOT INTRACTABLE: ICD-10-CM

## 2024-08-28 DIAGNOSIS — R22.43 LOCALIZED SWELLING OF BOTH LOWER LEGS: ICD-10-CM

## 2024-08-28 DIAGNOSIS — R63.5 WEIGHT GAIN: ICD-10-CM

## 2024-08-28 DIAGNOSIS — R79.89 LOW VITAMIN D LEVEL: ICD-10-CM

## 2024-08-28 DIAGNOSIS — Q79.60 EHLERS-DANLOS SYNDROME (HHS-HCC): ICD-10-CM

## 2024-08-28 DIAGNOSIS — E88.819 INSULIN RESISTANCE: ICD-10-CM

## 2024-08-28 DIAGNOSIS — Z12.11 COLON CANCER SCREENING: ICD-10-CM

## 2024-08-28 DIAGNOSIS — Z76.89 ENCOUNTER TO ESTABLISH CARE: ICD-10-CM

## 2024-08-28 DIAGNOSIS — E53.8 VITAMIN B12 DEFICIENCY: Primary | ICD-10-CM

## 2024-08-28 DIAGNOSIS — E78.2 MIXED HYPERLIPIDEMIA: ICD-10-CM

## 2024-08-28 LAB
HCV AB SER QL: NONREACTIVE
TTG IGA SER IA-ACNC: <1 U/ML

## 2024-08-28 PROCEDURE — 3008F BODY MASS INDEX DOCD: CPT | Performed by: CLINICAL NURSE SPECIALIST

## 2024-08-28 PROCEDURE — 4004F PT TOBACCO SCREEN RCVD TLK: CPT | Performed by: CLINICAL NURSE SPECIALIST

## 2024-08-28 PROCEDURE — 99214 OFFICE O/P EST MOD 30 MIN: CPT | Performed by: CLINICAL NURSE SPECIALIST

## 2024-08-28 RX ORDER — ERGOCALCIFEROL 1.25 MG/1
50000 CAPSULE ORAL 2 TIMES WEEKLY
Qty: 24 CAPSULE | Refills: 3 | Status: SHIPPED | OUTPATIENT
Start: 2024-08-29 | End: 2025-08-29

## 2024-08-28 ASSESSMENT — ENCOUNTER SYMPTOMS
BACK PAIN: 0
POLYDIPSIA: 0
FEVER: 0
CONSTIPATION: 0
VOMITING: 0
CONFUSION: 0
SLEEP DISTURBANCE: 0
PHOTOPHOBIA: 0
FLANK PAIN: 0
BRUISES/BLEEDS EASILY: 0
EYE PAIN: 0
ACTIVITY CHANGE: 0
UNEXPECTED WEIGHT CHANGE: 0
OCCASIONAL FEELINGS OF UNSTEADINESS: 0
ARTHRALGIAS: 0
HEADACHES: 0
HEMATURIA: 0
PALPITATIONS: 0
WHEEZING: 0
SEIZURES: 0
FATIGUE: 0
COUGH: 0
NECK PAIN: 0
DYSURIA: 0
WOUND: 0
SHORTNESS OF BREATH: 0
CHEST TIGHTNESS: 0
TROUBLE SWALLOWING: 0
BLOOD IN STOOL: 0
SORE THROAT: 0
NAUSEA: 0
APPETITE CHANGE: 0
LOSS OF SENSATION IN FEET: 0
ABDOMINAL PAIN: 0
JOINT SWELLING: 0
MYALGIAS: 0
CHILLS: 0
DIARRHEA: 0
DIZZINESS: 0
DEPRESSION: 0

## 2024-08-28 ASSESSMENT — COLUMBIA-SUICIDE SEVERITY RATING SCALE - C-SSRS
2. HAVE YOU ACTUALLY HAD ANY THOUGHTS OF KILLING YOURSELF?: NO
6. HAVE YOU EVER DONE ANYTHING, STARTED TO DO ANYTHING, OR PREPARED TO DO ANYTHING TO END YOUR LIFE?: NO
1. IN THE PAST MONTH, HAVE YOU WISHED YOU WERE DEAD OR WISHED YOU COULD GO TO SLEEP AND NOT WAKE UP?: NO

## 2024-08-28 ASSESSMENT — PATIENT HEALTH QUESTIONNAIRE - PHQ9
SUM OF ALL RESPONSES TO PHQ9 QUESTIONS 1 AND 2: 0
1. LITTLE INTEREST OR PLEASURE IN DOING THINGS: NOT AT ALL
2. FEELING DOWN, DEPRESSED OR HOPELESS: NOT AT ALL

## 2024-08-28 NOTE — PROGRESS NOTES
Subjective   Patient ID: Yolanda Kumar is a 49 y.o. female who presents for Follow-up (Follow up).  HPI    Here today as a follow up appointment. Discuss results of blood work.   Has been stable on medications, feels well on current regimen.      Follows with Dr. Aguilar for Rheumatology.     Review of Systems   Constitutional:  Negative for activity change, appetite change, chills, fatigue, fever and unexpected weight change.   HENT:  Negative for ear pain, hearing loss, nosebleeds, sore throat, tinnitus and trouble swallowing.    Eyes:  Negative for photophobia, pain and visual disturbance.   Respiratory:  Negative for cough, chest tightness, shortness of breath and wheezing.    Cardiovascular:  Negative for chest pain, palpitations and leg swelling.   Gastrointestinal:  Negative for abdominal pain, blood in stool, constipation, diarrhea, nausea and vomiting.   Endocrine: Negative for cold intolerance, heat intolerance, polydipsia and polyuria.   Genitourinary:  Negative for dysuria, flank pain and hematuria.   Musculoskeletal:  Negative for arthralgias, back pain, joint swelling, myalgias and neck pain.   Skin:  Negative for pallor, rash and wound.   Allergic/Immunologic: Negative for immunocompromised state.   Neurological:  Negative for dizziness, seizures and headaches.   Hematological:  Does not bruise/bleed easily.   Psychiatric/Behavioral:  Negative for confusion and sleep disturbance.        Objective   Physical Exam  Vitals and nursing note reviewed.   Constitutional:       General: She is not in acute distress.     Appearance: Normal appearance.   HENT:      Head: Normocephalic.      Nose: Nose normal.   Eyes:      Conjunctiva/sclera: Conjunctivae normal.   Neck:      Vascular: No carotid bruit.   Cardiovascular:      Rate and Rhythm: Normal rate and regular rhythm.      Pulses: Normal pulses.      Heart sounds: Normal heart sounds.   Pulmonary:      Effort: Pulmonary effort is normal.      Breath  sounds: Normal breath sounds.   Abdominal:      General: Bowel sounds are normal.      Palpations: Abdomen is soft.   Musculoskeletal:         General: Normal range of motion.      Cervical back: Normal range of motion.   Skin:     General: Skin is warm and dry.   Neurological:      Mental Status: She is alert and oriented to person, place, and time. Mental status is at baseline.   Psychiatric:         Mood and Affect: Mood normal.         Behavior: Behavior normal.       Assessment/Plan        Reviewed results of blood work completed with patient.       Vitamin B12 Deficiency: Vitamin B12 Injections with oral Vitamin B12. Reevaluate with next lab work.   Asthma: Respiratory status at baseline. Continue to monitor.   Mirela-Danlos, Fibromyalgia: Continue to follow with Rheumatology as previously determined.   Chronic Migraine: Continue medication management.   Hyperlipidemia: Updated lab work ordered.   Vitamin D Deficiency: Vitamin D. Reevalaute with next lab work.   Insulin Resistance: Updated lab work ordered.   Obesity: BMI: 56.86. Lifestyle changes recommended: Diet consisting of low fat foods, lean meats, high fiber, fresh fruits and vegetables. 150 min/ weekly aerobic exercise. Declined Bariatrics referral. Followed with Nutritionist.      Mammogram: October 2023, negative per patient. New order placed.   Colonoscopy ordered: Scheduled for September.  Declined updated immunizations.     Ivonne Mars, RUDI-CNS 08/28/24 2:20 PM

## 2024-09-18 ENCOUNTER — ANESTHESIA EVENT (OUTPATIENT)
Dept: OPERATING ROOM | Facility: HOSPITAL | Age: 49
End: 2024-09-18
Payer: COMMERCIAL

## 2024-09-19 ENCOUNTER — PRE-ADMISSION TESTING (OUTPATIENT)
Dept: PREADMISSION TESTING | Facility: HOSPITAL | Age: 49
End: 2024-09-19
Payer: COMMERCIAL

## 2024-09-19 ENCOUNTER — HOSPITAL ENCOUNTER (OUTPATIENT)
Dept: CARDIOLOGY | Facility: HOSPITAL | Age: 49
Discharge: HOME | End: 2024-09-19
Payer: COMMERCIAL

## 2024-09-19 VITALS
TEMPERATURE: 97.8 F | RESPIRATION RATE: 22 BRPM | OXYGEN SATURATION: 97 % | DIASTOLIC BLOOD PRESSURE: 76 MMHG | HEART RATE: 102 BPM | WEIGHT: 293 LBS | HEIGHT: 63 IN | BODY MASS INDEX: 51.91 KG/M2 | SYSTOLIC BLOOD PRESSURE: 122 MMHG

## 2024-09-19 DIAGNOSIS — E66.01 CLASS 3 SEVERE OBESITY DUE TO EXCESS CALORIES WITH BODY MASS INDEX (BMI) OF 50.0 TO 59.9 IN ADULT, UNSPECIFIED WHETHER SERIOUS COMORBIDITY PRESENT: ICD-10-CM

## 2024-09-19 DIAGNOSIS — Z12.11 SCREENING FOR MALIGNANT NEOPLASM OF COLON: ICD-10-CM

## 2024-09-19 DIAGNOSIS — Q79.60 EHLERS-DANLOS SYNDROME (HHS-HCC): ICD-10-CM

## 2024-09-19 DIAGNOSIS — Z01.818 PRE-OP EVALUATION: Primary | ICD-10-CM

## 2024-09-19 LAB
ERYTHROCYTE [DISTWIDTH] IN BLOOD BY AUTOMATED COUNT: 14 % (ref 11.5–14.5)
HCT VFR BLD AUTO: 43.8 % (ref 36–46)
HGB BLD-MCNC: 14.9 G/DL (ref 12–16)
MCH RBC QN AUTO: 30.2 PG (ref 26–34)
MCHC RBC AUTO-ENTMCNC: 34 G/DL (ref 32–36)
MCV RBC AUTO: 89 FL (ref 80–100)
NRBC BLD-RTO: 0 /100 WBCS (ref 0–0)
PLATELET # BLD AUTO: 279 X10*3/UL (ref 150–450)
RBC # BLD AUTO: 4.94 X10*6/UL (ref 4–5.2)
WBC # BLD AUTO: 8.8 X10*3/UL (ref 4.4–11.3)

## 2024-09-19 PROCEDURE — 36415 COLL VENOUS BLD VENIPUNCTURE: CPT

## 2024-09-19 PROCEDURE — 93005 ELECTROCARDIOGRAM TRACING: CPT

## 2024-09-19 PROCEDURE — 85027 COMPLETE CBC AUTOMATED: CPT

## 2024-09-19 ASSESSMENT — LIFESTYLE VARIABLES: SMOKING_STATUS: NONSMOKER

## 2024-09-19 NOTE — CPM/PAT H&P
CPM/PAT Evaluation       Name: Yolanda Kumar (Yolanda Kumar)  /Age: 1975/49 y.o.     In-Person       Chief Complaint: Scheduled for colonoscopy under MAC anesthesia per Dr. Xiao on 24.     Past Medical History:   Diagnosis Date    Acquired clubfoot, right foot     Acquired bilateral club feet    Anxiety     Asthma (HHS-HCC)     Dental disease     chipped x 2    Depression     Easy bruising     Fibromyalgia, primary     Interstitial cystitis (chronic) without hematuria     IC (interstitial cystitis)    Joint pain     Personal history of other diseases of the nervous system and sense organs     History of cataract    Personal history of other diseases of the respiratory system     History of allergic rhinitis    Vision loss        Past Surgical History:   Procedure Laterality Date     SECTION, CLASSIC      x 2    COLONOSCOPY      KNEE ARTHROSCOPY W/ MENISCECTOMY Right         OTHER SURGICAL HISTORY  2020    Cholecystectomy    OTHER SURGICAL HISTORY  2020    Tonsillectomy with adenoidectomy    OTHER SURGICAL HISTORY  2020    Oophorectomy    OTHER SURGICAL HISTORY  2020    Foot surgery    OTHER SURGICAL HISTORY  2020    Cataract surgery congenital    OTHER SURGICAL HISTORY  02/10/2022    Hysterectomy       Patient  has no history on file for sexual activity.    Family History   Problem Relation Name Age of Onset    Diabetes Mother      Mirela-Danlos syndrome Mother      Prostate cancer Father      Other (htn) Father      Diabetes Father         Allergies   Allergen Reactions    Influenza Virus Vaccines Anaphylaxis       Prior to Admission medications    Medication Sig Start Date End Date Taking? Authorizing Provider   albuterol 90 mcg/actuation inhaler Inhale 2 puffs every 4 hours if needed. 20   Historical Provider, MD   amitriptyline (Elavil) 10 mg tablet Take 1 tablet (10 mg) by mouth once daily at bedtime.    Historical Provider, MD   buPROPion  "(Wellbutrin) 75 mg tablet Take 2 tablets (150 mg) by mouth once daily. 2/21/24 2/20/25  REHANA Ramirez   butalbital-acetaminophen-caff (Fioricet) -40 mg capsule Take 1 capsule by mouth once daily as needed for headaches. 7/5/23   RUDI Deleon-CNP   cyanocobalamin (Vitamin B-12) 1,000 mcg/mL injection Inject 1 mL (1,000 mcg) into the muscle every 30 (thirty) days. 5/22/24   REHANA Ramirez   cyanocobalamin, vitamin B-12, (Vitamin B-12) 2,500 mcg tablet, sublingual SL tablet Place 1 tablet (2,500 mcg) under the tongue once daily. 5/22/24 5/22/25  REHANA Ramirez   cyclobenzaprine (Flexeril) 5 mg tablet Take 1 tablet (5 mg) by mouth 2 times a day.    Historical Provider, MD   ergocalciferol (Vitamin D-2) 1.25 MG (69628 UT) capsule Take 1 capsule (50,000 Units) by mouth 2 times a week. 8/29/24 8/29/25  REHANA Ramirez   gabapentin (Neurontin) 400 mg capsule Take 1 capsule (400 mg) by mouth 4 times a day.    Historical Provider, MD   naltrexone (Depade) 50 mg tablet Take 0.5 tablets (25 mg) by mouth once daily. 2/21/24 2/20/25  REHANA Ramirez   naproxen (Naprosyn) 500 mg tablet Take 1 tablet (500 mg) by mouth 2 times daily (morning and late afternoon).    Historical Provider, MD   rimegepant (Nurtec ODT) 75 mg tablet,disintegrating Take 1 tablet (75 mg) by mouth every other day. 2/21/24   REHANA Ramirez   syringe with needle 3 mL 21 gauge x 1\" syringe 1 each every 30 (thirty) days. Use for b12 injection 5/24/24   REHANA Ramirez   syringe with needle, safety 3 mL 20 gauge x 1 1/2\" syringe 1 each every 30 (thirty) days. Use for b12 injection 5/27/24   RUDI Ramirez-CNS   topiramate (Topamax) 50 mg tablet Take 1 tablet (50 mg) by mouth every 12 hours. 5/23/24   Historical Provider, MD   torsemide (Demadex) 10 mg tablet Take 1 tablet (10 mg) by mouth once daily. 2/21/24 2/20/25  REHANA Ramirez          PAT AIRWAY:   Airway:    "   Thicker neck.   + nose and + tongue ring.    Mallampati::  III    TM distance::  >3 FB    Neck ROM::  Full   Chipped teeth x2      Visit Vitals  /76   Pulse 102   Temp 36.6 °C (97.8 °F) (Oral)   Resp 22       DASI Risk Score    No data to display       Caprini DVT Assessment      Flowsheet Row Most Recent Value   DVT Score 5   Current Status Minor surgery planned   Age 40-59 years   BMI Greater than 50 (Venous stasis syndrome)          Modified Frailty Index    No data to display       CHADS2 Stroke Risk  Current as of 35 minutes ago        N/A 3 to 100%: High Risk   2 to < 3%: Medium Risk   0 to < 2%: Low Risk     Last Change: N/A          This score determines the patient's risk of having a stroke if the patient has atrial fibrillation.        This score is not applicable to this patient. Components are not calculated.          Revised Cardiac Risk Index      Flowsheet Row Most Recent Value   Revised Cardiac Risk Calculator 0          Apfel Simplified Score      Flowsheet Row Most Recent Value   Apfel Simplified Score Calculator 2          Risk Analysis Index Results This Encounter    No data found in the last 1 encounters.       Stop Bang Score      Flowsheet Row Most Recent Value   Do you snore loudly? 1   Do you often feel tired or fatigued after your sleep? 0   Has anyone ever observed you stop breathing in your sleep? 1   Do you have or are you being treated for high blood pressure? 0   Recent BMI (Calculated) 56.9   Is BMI greater than 35 kg/m2? 1=Yes   Age older than 50 years old? 0=No   Is your neck circumference greater than 17 inches (Male) or 16 inches (Female)? 1   Gender - Male 0=No   STOP-BANG Total Score 4          Current Outpatient Medications on File Prior to Visit   Medication Sig Dispense Refill    albuterol 90 mcg/actuation inhaler Inhale 2 puffs every 4 hours if needed.      amitriptyline (Elavil) 10 mg tablet Take 1 tablet (10 mg) by mouth once daily at bedtime.      buPROPion  "(Wellbutrin) 75 mg tablet Take 2 tablets (150 mg) by mouth once daily. 180 tablet 3    butalbital-acetaminophen-caff (Fioricet) -40 mg capsule Take 1 capsule by mouth once daily as needed for headaches. 14 capsule 0    cyanocobalamin (Vitamin B-12) 1,000 mcg/mL injection Inject 1 mL (1,000 mcg) into the muscle every 30 (thirty) days. 1 mL 11    cyanocobalamin, vitamin B-12, (Vitamin B-12) 2,500 mcg tablet, sublingual SL tablet Place 1 tablet (2,500 mcg) under the tongue once daily. 90 tablet 3    cyclobenzaprine (Flexeril) 5 mg tablet Take 1 tablet (5 mg) by mouth 2 times a day.      ergocalciferol (Vitamin D-2) 1.25 MG (05167 UT) capsule Take 1 capsule (50,000 Units) by mouth 2 times a week. 24 capsule 3    gabapentin (Neurontin) 400 mg capsule Take 1 capsule (400 mg) by mouth 4 times a day.      naltrexone (Depade) 50 mg tablet Take 0.5 tablets (25 mg) by mouth once daily. 45 tablet 3    naproxen (Naprosyn) 500 mg tablet Take 1 tablet (500 mg) by mouth 2 times daily (morning and late afternoon).      rimegepant (Nurtec ODT) 75 mg tablet,disintegrating Take 1 tablet (75 mg) by mouth every other day. 15 tablet 11    syringe with needle 3 mL 21 gauge x 1\" syringe 1 each every 30 (thirty) days. Use for b12 injection 1 each 11    syringe with needle, safety 3 mL 20 gauge x 1 1/2\" syringe 1 each every 30 (thirty) days. Use for b12 injection 1 each 11    topiramate (Topamax) 50 mg tablet Take 1 tablet (50 mg) by mouth every 12 hours.      torsemide (Demadex) 10 mg tablet Take 1 tablet (10 mg) by mouth once daily. 90 tablet 3     No current facility-administered medications on file prior to visit.      Results for orders placed or performed in visit on 09/19/24 (from the past 24 hour(s))   CBC   Result Value Ref Range    WBC 8.8 4.4 - 11.3 x10*3/uL    nRBC 0.0 0.0 - 0.0 /100 WBCs    RBC 4.94 4.00 - 5.20 x10*6/uL    Hemoglobin 14.9 12.0 - 16.0 g/dL    Hematocrit 43.8 36.0 - 46.0 %    MCV 89 80 - 100 fL    MCH 30.2 " 26.0 - 34.0 pg    MCHC 34.0 32.0 - 36.0 g/dL    RDW 14.0 11.5 - 14.5 %    Platelets 279 150 - 450 x10*3/uL      Assessment and Plan:     Gastrointestinal:  Scheduled for colonoscopy under MAC anesthesia per Dr. Xiao on 9/20/24.   CBC is WNL. CMP completed on 8/217/24. Reviewed.   EKG today shows SR.   H&P dated 8/28/24 per Ivonne GRIJALVA  Airway assessment completed today.   All surgery instructions reviewed with patient by RN. Verbalized understanding.

## 2024-09-19 NOTE — PREPROCEDURE INSTRUCTIONS
"   Medication List            Accurate as of September 19, 2024  2:32 PM. Always use your most recent med list.                albuterol 90 mcg/actuation inhaler     amitriptyline 10 mg tablet  Commonly known as: Elavil     buPROPion 75 mg tablet  Commonly known as: Wellbutrin  Take 2 tablets (150 mg) by mouth once daily.     butalbital-acetaminophen-caff -40 mg capsule  Commonly known as: Fioricet  Take 1 capsule by mouth once daily as needed for headaches.     * cyanocobalamin (vitamin B-12) 2,500 mcg tablet, sublingual SL tablet  Commonly known as: Vitamin B-12  Place 1 tablet (2,500 mcg) under the tongue once daily.     * cyanocobalamin 1,000 mcg/mL injection  Commonly known as: Vitamin B-12  Inject 1 mL (1,000 mcg) into the muscle every 30 (thirty) days.     cyclobenzaprine 5 mg tablet  Commonly known as: Flexeril     ergocalciferol 1.25 MG (34108 UT) capsule  Commonly known as: Vitamin D-2  Take 1 capsule (50,000 Units) by mouth 2 times a week.     gabapentin 400 mg capsule  Commonly known as: Neurontin     naltrexone 50 mg tablet  Commonly known as: Depade  Take 0.5 tablets (25 mg) by mouth once daily.     naproxen 500 mg tablet  Commonly known as: Naprosyn     rimegepant 75 mg tablet,disintegrating  Commonly known as: Nurtec ODT  Take 1 tablet (75 mg) by mouth every other day.     syringe with needle 3 mL 21 gauge x 1\" syringe  1 each every 30 (thirty) days. Use for b12 injection     syringe with needle, safety 3 mL 20 gauge x 1 1/2\" syringe  1 each every 30 (thirty) days. Use for b12 injection     topiramate 50 mg tablet  Commonly known as: Topamax     torsemide 10 mg tablet  Commonly known as: Demadex  Take 1 tablet (10 mg) by mouth once daily.           * This list has 2 medication(s) that are the same as other medications prescribed for you. Read the directions carefully, and ask your doctor or other care provider to review them with you.                                  NPO " Instructions:    Torsemide morning of colonoscopy with a small sip of water  Follow dr. Xiao's bowel prep instructions    Additional Instructions:     Wear  comfortable loose fitting clothing  Do not use moisturizers, creams, lotions or perfume  All jewelry and valuables should be left at home  Shower morning of procedure deodorant only

## 2024-09-20 ENCOUNTER — HOSPITAL ENCOUNTER (OUTPATIENT)
Dept: OPERATING ROOM | Facility: HOSPITAL | Age: 49
Discharge: HOME | End: 2024-09-20
Payer: COMMERCIAL

## 2024-09-20 ENCOUNTER — ANESTHESIA (OUTPATIENT)
Dept: OPERATING ROOM | Facility: HOSPITAL | Age: 49
End: 2024-09-20
Payer: COMMERCIAL

## 2024-09-20 VITALS
HEART RATE: 70 BPM | OXYGEN SATURATION: 100 % | DIASTOLIC BLOOD PRESSURE: 82 MMHG | TEMPERATURE: 98 F | SYSTOLIC BLOOD PRESSURE: 126 MMHG | RESPIRATION RATE: 24 BRPM

## 2024-09-20 DIAGNOSIS — Z12.11 COLON CANCER SCREENING: ICD-10-CM

## 2024-09-20 LAB
ATRIAL RATE: 76 BPM
P AXIS: 35 DEGREES
PR INTERVAL: 152 MS
Q ONSET: 249 MS
QRS COUNT: 12 BEATS
QRS DURATION: 72 MS
QT INTERVAL: 350 MS
QTC CALCULATION(BAZETT): 391 MS
QTC FREDERICIA: 376 MS
R AXIS: 52 DEGREES
T AXIS: 45 DEGREES
T OFFSET: 424 MS
VENTRICULAR RATE: 75 BPM

## 2024-09-20 PROCEDURE — 7100000010 HC PHASE TWO TIME - EACH INCREMENTAL 1 MINUTE: Performed by: NURSE ANESTHETIST, CERTIFIED REGISTERED

## 2024-09-20 PROCEDURE — 3600000002 HC OR TIME - INITIAL BASE CHARGE - PROCEDURE LEVEL TWO: Performed by: NURSE ANESTHETIST, CERTIFIED REGISTERED

## 2024-09-20 PROCEDURE — 3700000002 HC GENERAL ANESTHESIA TIME - EACH INCREMENTAL 1 MINUTE: Performed by: NURSE ANESTHETIST, CERTIFIED REGISTERED

## 2024-09-20 PROCEDURE — 7100000001 HC RECOVERY ROOM TIME - INITIAL BASE CHARGE: Performed by: NURSE ANESTHETIST, CERTIFIED REGISTERED

## 2024-09-20 PROCEDURE — 45385 COLONOSCOPY W/LESION REMOVAL: CPT | Performed by: INTERNAL MEDICINE

## 2024-09-20 PROCEDURE — 7100000009 HC PHASE TWO TIME - INITIAL BASE CHARGE: Performed by: NURSE ANESTHETIST, CERTIFIED REGISTERED

## 2024-09-20 PROCEDURE — 2500000001 HC RX 250 WO HCPCS SELF ADMINISTERED DRUGS (ALT 637 FOR MEDICARE OP): Performed by: ANESTHESIOLOGY

## 2024-09-20 PROCEDURE — 2500000004 HC RX 250 GENERAL PHARMACY W/ HCPCS (ALT 636 FOR OP/ED): Performed by: ANESTHESIOLOGY

## 2024-09-20 PROCEDURE — 2500000004 HC RX 250 GENERAL PHARMACY W/ HCPCS (ALT 636 FOR OP/ED): Performed by: NURSE ANESTHETIST, CERTIFIED REGISTERED

## 2024-09-20 PROCEDURE — 2500000005 HC RX 250 GENERAL PHARMACY W/O HCPCS: Performed by: NURSE ANESTHETIST, CERTIFIED REGISTERED

## 2024-09-20 PROCEDURE — 7100000002 HC RECOVERY ROOM TIME - EACH INCREMENTAL 1 MINUTE: Performed by: NURSE ANESTHETIST, CERTIFIED REGISTERED

## 2024-09-20 PROCEDURE — 3700000001 HC GENERAL ANESTHESIA TIME - INITIAL BASE CHARGE: Performed by: NURSE ANESTHETIST, CERTIFIED REGISTERED

## 2024-09-20 PROCEDURE — 3600000007 HC OR TIME - EACH INCREMENTAL 1 MINUTE - PROCEDURE LEVEL TWO: Performed by: NURSE ANESTHETIST, CERTIFIED REGISTERED

## 2024-09-20 RX ORDER — SODIUM CITRATE AND CITRIC ACID MONOHYDRATE 334; 500 MG/5ML; MG/5ML
30 SOLUTION ORAL ONCE
Status: COMPLETED | OUTPATIENT
Start: 2024-09-20 | End: 2024-09-20

## 2024-09-20 RX ORDER — PROPOFOL 10 MG/ML
INJECTION, EMULSION INTRAVENOUS AS NEEDED
Status: DISCONTINUED | OUTPATIENT
Start: 2024-09-20 | End: 2024-09-20

## 2024-09-20 RX ORDER — SODIUM CHLORIDE 9 MG/ML
50 INJECTION, SOLUTION INTRAVENOUS CONTINUOUS
Status: DISCONTINUED | OUTPATIENT
Start: 2024-09-20 | End: 2024-09-21 | Stop reason: HOSPADM

## 2024-09-20 RX ORDER — FAMOTIDINE 10 MG/ML
20 INJECTION INTRAVENOUS ONCE
Status: COMPLETED | OUTPATIENT
Start: 2024-09-20 | End: 2024-09-20

## 2024-09-20 RX ORDER — LIDOCAINE HCL/PF 100 MG/5ML
SYRINGE (ML) INTRAVENOUS AS NEEDED
Status: DISCONTINUED | OUTPATIENT
Start: 2024-09-20 | End: 2024-09-20

## 2024-09-20 RX ORDER — FENTANYL CITRATE 50 UG/ML
INJECTION, SOLUTION INTRAMUSCULAR; INTRAVENOUS AS NEEDED
Status: DISCONTINUED | OUTPATIENT
Start: 2024-09-20 | End: 2024-09-20

## 2024-09-20 RX ORDER — ALBUTEROL SULFATE 0.83 MG/ML
2.5 SOLUTION RESPIRATORY (INHALATION) ONCE AS NEEDED
Status: DISCONTINUED | OUTPATIENT
Start: 2024-09-20 | End: 2024-09-21 | Stop reason: HOSPADM

## 2024-09-20 RX ORDER — METOCLOPRAMIDE HYDROCHLORIDE 5 MG/ML
10 INJECTION INTRAMUSCULAR; INTRAVENOUS ONCE
Status: COMPLETED | OUTPATIENT
Start: 2024-09-20 | End: 2024-09-20

## 2024-09-20 RX ORDER — IPRATROPIUM BROMIDE AND ALBUTEROL SULFATE 2.5; .5 MG/3ML; MG/3ML
3 SOLUTION RESPIRATORY (INHALATION) ONCE
Status: DISCONTINUED | OUTPATIENT
Start: 2024-09-20 | End: 2024-09-20 | Stop reason: HOSPADM

## 2024-09-20 SDOH — HEALTH STABILITY: MENTAL HEALTH: CURRENT SMOKER: 1

## 2024-09-20 ASSESSMENT — PAIN SCALES - GENERAL
PAINLEVEL_OUTOF10: 0 - NO PAIN
PAINLEVEL_OUTOF10: 0 - NO PAIN
PAINLEVEL_OUTOF10: 5 - MODERATE PAIN
PAINLEVEL_OUTOF10: 0 - NO PAIN
PAINLEVEL_OUTOF10: 0 - NO PAIN
PAIN_LEVEL: 2

## 2024-09-20 ASSESSMENT — COLUMBIA-SUICIDE SEVERITY RATING SCALE - C-SSRS
2. HAVE YOU ACTUALLY HAD ANY THOUGHTS OF KILLING YOURSELF?: NO
1. IN THE PAST MONTH, HAVE YOU WISHED YOU WERE DEAD OR WISHED YOU COULD GO TO SLEEP AND NOT WAKE UP?: NO
6. HAVE YOU EVER DONE ANYTHING, STARTED TO DO ANYTHING, OR PREPARED TO DO ANYTHING TO END YOUR LIFE?: NO

## 2024-09-20 ASSESSMENT — PAIN - FUNCTIONAL ASSESSMENT: PAIN_FUNCTIONAL_ASSESSMENT: 0-10

## 2024-09-20 NOTE — H&P
Pre-sedation Evaluation:  Sedation Necessary For: Analgesia  Sedation to be Managed By: Anesthesia (Monitored Anesthesia Care/MAC)    History of Present Illness and Indication for Procedure      Yolanda Kumar is a 49 y.o. female with a history of HLD, obesity (BMI 56), fibromyalgia, migraines, Mirela-Danlos Syndrome, and asthma who presents for screening colonoscopy.       She says that she did have a colonoscopy about 9-10 years ago that was normal. She says that colonoscopy was done to work up chronic diarrhea and she says that biopsies at that time that were also normal. Her mother and father have had polyps in the past, but no family history of colon cancer.       NPO guidelines met: Yes         Review of Systems  Constitutional:  Negative for chills, fever and unexpected weight change.   HENT:  Negative for trouble swallowing.    Respiratory:  Negative for shortness of breath.    Cardiovascular:  Negative for chest pain.   Gastrointestinal:  As above.   Skin:  Negative for color change.       I performed a complete 10 point review of systems and it is negative except as noted in HPI or above. All other systems have been reviewed and are negative.      Patient Active Problem List   Diagnosis    Acute pain of left knee    Anxiety    Back pain, chronic    Chronic headaches    Fibromyalgia    Mirela-Danlos syndrome (HHS-HCC)    Hyperlipidemia    Insulin resistance    Low vitamin D level    Migraine headache    Mild intermittent asthma (HHS-HCC)    Morbid obesity (Multi)    Candidiasis of vagina    Knee pain    Ankle pain    Hyperglycemia    Chronic interstitial cystitis    Acquired equinovarus deformity of both feet       Past Medical History:  She has a past medical history of Acquired clubfoot, right foot, Anxiety, Asthma (HHS-HCC), Dental disease, Depression, Easy bruising, Fibromyalgia, primary, Interstitial cystitis (chronic) without hematuria, Joint pain, Personal history of other diseases of the nervous  system and sense organs, Personal history of other diseases of the respiratory system, and Vision loss.    Past Surgical History:  She has a past surgical history that includes Other surgical history (2020); Other surgical history (2020); Other surgical history (2020); Other surgical history (2020); Other surgical history (2020); Other surgical history (02/10/2022);  section, classic; Knee arthroscopy w/ meniscectomy (Right); and Colonoscopy.      Social History:  She reports that she has been smoking cigarettes. She has a 12.5 pack-year smoking history. She has never used smokeless tobacco. She reports that she does not drink alcohol and does not use drugs.    Family History:  Family History   Problem Relation Name Age of Onset    Diabetes Mother      Mirela-Danlos syndrome Mother      Prostate cancer Father      Other (htn) Father      Diabetes Father          Allergies:  Influenza virus vaccines    Current Medications  Current Outpatient Medications on File Prior to Encounter   Medication Sig Dispense Refill    albuterol 90 mcg/actuation inhaler Inhale 2 puffs every 4 hours if needed.      amitriptyline (Elavil) 10 mg tablet Take 1 tablet (10 mg) by mouth once daily at bedtime.      buPROPion (Wellbutrin) 75 mg tablet Take 2 tablets (150 mg) by mouth once daily. 180 tablet 3    butalbital-acetaminophen-caff (Fioricet) -40 mg capsule Take 1 capsule by mouth once daily as needed for headaches. 14 capsule 0    cyanocobalamin (Vitamin B-12) 1,000 mcg/mL injection Inject 1 mL (1,000 mcg) into the muscle every 30 (thirty) days. 1 mL 11    cyanocobalamin, vitamin B-12, (Vitamin B-12) 2,500 mcg tablet, sublingual SL tablet Place 1 tablet (2,500 mcg) under the tongue once daily. 90 tablet 3    cyclobenzaprine (Flexeril) 5 mg tablet Take 1 tablet (5 mg) by mouth 2 times a day.      ergocalciferol (Vitamin D-2) 1.25 MG (91753 UT) capsule Take 1 capsule (50,000 Units) by mouth 2  "times a week. 24 capsule 3    gabapentin (Neurontin) 400 mg capsule Take 1 capsule (400 mg) by mouth 4 times a day.      naltrexone (Depade) 50 mg tablet Take 0.5 tablets (25 mg) by mouth once daily. 45 tablet 3    naproxen (Naprosyn) 500 mg tablet Take 1 tablet (500 mg) by mouth 2 times daily (morning and late afternoon).      rimegepant (Nurtec ODT) 75 mg tablet,disintegrating Take 1 tablet (75 mg) by mouth every other day. 15 tablet 11    syringe with needle 3 mL 21 gauge x 1\" syringe 1 each every 30 (thirty) days. Use for b12 injection 1 each 11    syringe with needle, safety 3 mL 20 gauge x 1 1/2\" syringe 1 each every 30 (thirty) days. Use for b12 injection 1 each 11    topiramate (Topamax) 50 mg tablet Take 1 tablet (50 mg) by mouth every 12 hours.      torsemide (Demadex) 10 mg tablet Take 1 tablet (10 mg) by mouth once daily. 90 tablet 3     No current facility-administered medications on file prior to encounter.         Last Recorded Vitals  There were no vitals taken for this visit.      Physical Exam  Vitals reviewed.   Constitutional:       General: She is not in acute distress.     Appearance: She is not ill-appearing.   HENT:      Mouth/Throat:      Comments: Mallampati: III  Cardiovascular:      Rate and Rhythm: Normal rate and regular rhythm.      Heart sounds: Normal heart sounds. No murmur heard.  Pulmonary:      Effort: Pulmonary effort is normal. No respiratory distress.      Breath sounds: Normal breath sounds. No wheezing.   Abdominal:      General: Bowel sounds are normal.      Palpations: Abdomen is soft.      Tenderness: There is no abdominal tenderness.   Skin:     General: Skin is warm and dry.   Neurological:      General: No focal deficit present.      Mental Status: She is alert and oriented to person, place, and time.   Psychiatric:         Mood and Affect: Mood normal.         Behavior: Behavior normal.         Thought Content: Thought content normal.         Judgment: Judgment " normal.              Assessment/Plan     Colonoscopy in OR with MAC sedation, ASA 3        Level of Sedation: Moderate Sedation  (Sedation medications to be delivered via monitored anesthesia care (MAC).     This evaluation serves as my H&P.     Outpatient medication list and allergies have been reviewed.  Pre-procedure/rizwan procedure antibiotics not needed.     Pre-procedure evaluation completed by physician.           Saleem Xiao MD

## 2024-09-20 NOTE — ANESTHESIA POSTPROCEDURE EVALUATION
Patient: Yolanda Kumar    Procedure Summary       Date: 09/20/24 Room / Location: [facility] OR    Anesthesia Start: 0906 Anesthesia Stop: 0936    Procedure: COLONOSCOPY Diagnosis: Colon cancer screening    Scheduled Providers: Saleem Xiao MD Responsible Provider: MORGAN Ricci    Anesthesia Type: MAC ASA Status: 3            Anesthesia Type: MAC    Vitals Value Taken Time   /82 09/20/24 1000   Temp 36.7 °C (98 °F) 09/20/24 0931   Pulse 70 09/20/24 1000   Resp 24 09/20/24 1000   SpO2 100 % 09/20/24 1000       Anesthesia Post Evaluation    Patient location during evaluation: bedside  Patient participation: complete - patient participated  Level of consciousness: awake and alert  Pain score: 2  Pain management: adequate  Airway patency: patent  Cardiovascular status: acceptable  Respiratory status: acceptable  Hydration status: acceptable  Postoperative Nausea and Vomiting: none        There were no known notable events for this encounter.

## 2024-09-20 NOTE — ANESTHESIA PREPROCEDURE EVALUATION
Patient: Yolanda Kumar    Procedure Information       Date/Time: 09/20/24 1000    Scheduled providers: Saleem Xiao MD    Procedure: COLONOSCOPY    Location: [facility] OR            Relevant Problems   Anesthesia (within normal limits)      Cardiac   (+) Hyperlipidemia      Pulmonary   (+) Mild intermittent asthma (HHS-HCC)      Neuro   (+) Anxiety   (+) Chronic headaches      Endocrine   (+) Morbid obesity (Multi)      Musculoskeletal   (+) Fibromyalgia      ID   (+) Candidiasis of vagina       Clinical information reviewed:   Tobacco  Allergies  Meds   Med Hx  Surg Hx  OB Status  Fam Hx  Soc   Hx        NPO Detail:  No data recorded     Physical Exam    Airway  Mallampati: III  TM distance: >3 FB  Neck ROM: full     Cardiovascular   Rhythm: regular  Rate: normal     Dental    Pulmonary   Breath sounds clear to auscultation     Abdominal   Abdomen: soft             Anesthesia Plan    History of general anesthesia?: yes  History of complications of general anesthesia?: no    ASA 3     MAC     The patient is a current smoker.  Patient was previously instructed to abstain from smoking on day of procedure.  Patient smoked on day of procedure.    intravenous induction   Anesthetic plan and risks discussed with patient.    Plan discussed with CRNA.

## 2024-09-23 NOTE — ADDENDUM NOTE
Encounter addended by: Alaina Moore RN on: 9/23/2024 12:56 PM   Actions taken: Contacts section saved

## 2024-09-26 LAB
LABORATORY COMMENT REPORT: NORMAL
PATH REPORT.FINAL DX SPEC: NORMAL
PATH REPORT.GROSS SPEC: NORMAL
PATH REPORT.RELEVANT HX SPEC: NORMAL
PATH REPORT.TOTAL CANCER: NORMAL
RESIDENT REVIEW: NORMAL

## 2024-12-05 ENCOUNTER — APPOINTMENT (OUTPATIENT)
Dept: PRIMARY CARE | Facility: CLINIC | Age: 49
End: 2024-12-05
Payer: COMMERCIAL

## 2025-01-29 ENCOUNTER — APPOINTMENT (OUTPATIENT)
Dept: RADIOLOGY | Facility: HOSPITAL | Age: 50
End: 2025-01-29
Payer: COMMERCIAL

## 2025-01-29 ENCOUNTER — HOSPITAL ENCOUNTER (EMERGENCY)
Facility: HOSPITAL | Age: 50
Discharge: HOME | End: 2025-01-29
Payer: COMMERCIAL

## 2025-01-29 VITALS
TEMPERATURE: 97.3 F | WEIGHT: 290 LBS | SYSTOLIC BLOOD PRESSURE: 132 MMHG | OXYGEN SATURATION: 99 % | HEIGHT: 63 IN | RESPIRATION RATE: 16 BRPM | BODY MASS INDEX: 51.38 KG/M2 | DIASTOLIC BLOOD PRESSURE: 95 MMHG | HEART RATE: 74 BPM

## 2025-01-29 DIAGNOSIS — R22.0 FACIAL SWELLING: Primary | ICD-10-CM

## 2025-01-29 LAB
ALBUMIN SERPL BCP-MCNC: 3.9 G/DL (ref 3.4–5)
ALP SERPL-CCNC: 61 U/L (ref 33–110)
ALT SERPL W P-5'-P-CCNC: 15 U/L (ref 7–45)
ANION GAP SERPL CALC-SCNC: 8 MMOL/L (ref 10–20)
AST SERPL W P-5'-P-CCNC: 12 U/L (ref 9–39)
BASOPHILS # BLD AUTO: 0.12 X10*3/UL (ref 0–0.1)
BASOPHILS NFR BLD AUTO: 1.4 %
BILIRUB SERPL-MCNC: 0.3 MG/DL (ref 0–1.2)
BUN SERPL-MCNC: 16 MG/DL (ref 6–23)
CALCIUM SERPL-MCNC: 9.5 MG/DL (ref 8.6–10.3)
CHLORIDE SERPL-SCNC: 107 MMOL/L (ref 98–107)
CO2 SERPL-SCNC: 25 MMOL/L (ref 21–32)
CREAT SERPL-MCNC: 0.8 MG/DL (ref 0.5–1.05)
CRP SERPL-MCNC: 0.17 MG/DL
EGFRCR SERPLBLD CKD-EPI 2021: 90 ML/MIN/1.73M*2
EOSINOPHIL # BLD AUTO: 0.34 X10*3/UL (ref 0–0.7)
EOSINOPHIL NFR BLD AUTO: 4 %
ERYTHROCYTE [DISTWIDTH] IN BLOOD BY AUTOMATED COUNT: 14.2 % (ref 11.5–14.5)
ERYTHROCYTE [SEDIMENTATION RATE] IN BLOOD BY WESTERGREN METHOD: 12 MM/H (ref 0–20)
GLUCOSE SERPL-MCNC: 97 MG/DL (ref 74–99)
HCT VFR BLD AUTO: 43.9 % (ref 36–46)
HGB BLD-MCNC: 14.5 G/DL (ref 12–16)
IMM GRANULOCYTES # BLD AUTO: 0.03 X10*3/UL (ref 0–0.7)
IMM GRANULOCYTES NFR BLD AUTO: 0.4 % (ref 0–0.9)
LACTATE SERPL-SCNC: 0.6 MMOL/L (ref 0.4–2)
LYMPHOCYTES # BLD AUTO: 3.05 X10*3/UL (ref 1.2–4.8)
LYMPHOCYTES NFR BLD AUTO: 36.1 %
MCH RBC QN AUTO: 29.5 PG (ref 26–34)
MCHC RBC AUTO-ENTMCNC: 33 G/DL (ref 32–36)
MCV RBC AUTO: 89 FL (ref 80–100)
MONOCYTES # BLD AUTO: 0.67 X10*3/UL (ref 0.1–1)
MONOCYTES NFR BLD AUTO: 7.9 %
NEUTROPHILS # BLD AUTO: 4.25 X10*3/UL (ref 1.2–7.7)
NEUTROPHILS NFR BLD AUTO: 50.2 %
NRBC BLD-RTO: 0 /100 WBCS (ref 0–0)
PLATELET # BLD AUTO: 322 X10*3/UL (ref 150–450)
POTASSIUM SERPL-SCNC: 4.2 MMOL/L (ref 3.5–5.3)
PROT SERPL-MCNC: 6.8 G/DL (ref 6.4–8.2)
RBC # BLD AUTO: 4.91 X10*6/UL (ref 4–5.2)
SODIUM SERPL-SCNC: 136 MMOL/L (ref 136–145)
WBC # BLD AUTO: 8.5 X10*3/UL (ref 4.4–11.3)

## 2025-01-29 PROCEDURE — 70490 CT SOFT TISSUE NECK W/O DYE: CPT | Performed by: STUDENT IN AN ORGANIZED HEALTH CARE EDUCATION/TRAINING PROGRAM

## 2025-01-29 PROCEDURE — 83605 ASSAY OF LACTIC ACID: CPT | Performed by: NURSE PRACTITIONER

## 2025-01-29 PROCEDURE — 99284 EMERGENCY DEPT VISIT MOD MDM: CPT | Mod: 25

## 2025-01-29 PROCEDURE — 86140 C-REACTIVE PROTEIN: CPT | Performed by: NURSE PRACTITIONER

## 2025-01-29 PROCEDURE — 80053 COMPREHEN METABOLIC PANEL: CPT | Performed by: NURSE PRACTITIONER

## 2025-01-29 PROCEDURE — 85025 COMPLETE CBC W/AUTO DIFF WBC: CPT | Performed by: NURSE PRACTITIONER

## 2025-01-29 PROCEDURE — 70490 CT SOFT TISSUE NECK W/O DYE: CPT

## 2025-01-29 PROCEDURE — 85652 RBC SED RATE AUTOMATED: CPT | Performed by: NURSE PRACTITIONER

## 2025-01-29 PROCEDURE — 2500000004 HC RX 250 GENERAL PHARMACY W/ HCPCS (ALT 636 FOR OP/ED): Performed by: NURSE PRACTITIONER

## 2025-01-29 PROCEDURE — 36415 COLL VENOUS BLD VENIPUNCTURE: CPT | Performed by: NURSE PRACTITIONER

## 2025-01-29 PROCEDURE — 96374 THER/PROPH/DIAG INJ IV PUSH: CPT

## 2025-01-29 RX ORDER — KETOROLAC TROMETHAMINE 30 MG/ML
15 INJECTION, SOLUTION INTRAMUSCULAR; INTRAVENOUS ONCE
Status: COMPLETED | OUTPATIENT
Start: 2025-01-29 | End: 2025-01-29

## 2025-01-29 RX ORDER — AMOXICILLIN AND CLAVULANATE POTASSIUM 875; 125 MG/1; MG/1
1 TABLET, FILM COATED ORAL 2 TIMES DAILY
Qty: 20 TABLET | Refills: 0 | Status: SHIPPED | OUTPATIENT
Start: 2025-01-29 | End: 2025-02-08

## 2025-01-29 RX ADMIN — KETOROLAC TROMETHAMINE 15 MG: 30 INJECTION, SOLUTION INTRAMUSCULAR at 20:00

## 2025-01-29 ASSESSMENT — PAIN SCALES - GENERAL
PAINLEVEL_OUTOF10: 8
PAINLEVEL_OUTOF10: 8

## 2025-01-29 ASSESSMENT — PAIN DESCRIPTION - LOCATION: LOCATION: JAW

## 2025-01-29 ASSESSMENT — PAIN DESCRIPTION - PAIN TYPE: TYPE: ACUTE PAIN

## 2025-01-29 ASSESSMENT — PAIN - FUNCTIONAL ASSESSMENT: PAIN_FUNCTIONAL_ASSESSMENT: 0-10

## 2025-01-29 ASSESSMENT — PAIN DESCRIPTION - ORIENTATION: ORIENTATION: RIGHT

## 2025-01-30 ENCOUNTER — TELEPHONE (OUTPATIENT)
Dept: PRIMARY CARE | Facility: CLINIC | Age: 50
End: 2025-01-30
Payer: COMMERCIAL

## 2025-01-30 NOTE — TELEPHONE ENCOUNTER
Ok to continue change of Antibiotics as prescribed in the ER yesterday and can schedule an acute next week. They did CT in the ER and referred to ENT as well.

## 2025-01-30 NOTE — TELEPHONE ENCOUNTER
She called and went to the dentist to have tooth fixed and she now has infection in her cheek and she has already did around of antibiotics and her face has gotten worst and she went to our ER and she got new set of antibiotics but she is scared cause it is in her face and really wants to come in and see you today please advise call her at [phone]

## 2025-01-30 NOTE — TELEPHONE ENCOUNTER
Patient states the dentist told her to see her PCP. States she does not have a infection in her tooth. States she has a lump on her check. States the infection is out side of her tooth.

## 2025-01-30 NOTE — ED PROVIDER NOTES
HPI   Chief Complaint   Patient presents with    Jaw Pain    Facial Swelling     Had tooth pulled , pain swelling since.        This is a 49-year-old  female, with a past medical history of fibromyalgia, Ehler's Danlos, depression, anxiety, and asthma, that is presenting to the emergency room with complaints of jaw pain and facial swelling.  The patient has been dealing with dental issues for the past few weeks.  The patient completed a course of antibiotics.  She noted swelling to her left jaw 5 days ago.  She made an appointment and saw her dentist on Monday.  He extracted her first molar.  She reports that she is still having pain and swelling to the jaw.  It radiates down into the neck.  Her face still looks very swollen.  She is still on antibiotics that were provided by her doctor.  She has felt chilled.  She talked to her doctor today and told her to come to the emergency room for further evaluation.  The patient feels like she is having a hard time swallowing.  She denies any cough or congestion symptoms.  She is not having any chest pain, shortness of breath, dizziness, palpitations, paresthesias, or focal weakness.  Denies any nausea or vomiting.  She is not exhibiting any rash.      History provided by:  Patient   used: No            Patient History   Past Medical History:   Diagnosis Date    Acquired clubfoot, right foot     Acquired bilateral club feet    Anxiety     Asthma     Dental disease     chipped x 2    Depression     Easy bruising     Fibromyalgia, primary     Interstitial cystitis (chronic) without hematuria     IC (interstitial cystitis)    Joint pain     Personal history of other diseases of the nervous system and sense organs     History of cataract    Personal history of other diseases of the respiratory system     History of allergic rhinitis    Vision loss      Past Surgical History:   Procedure Laterality Date     SECTION, CLASSIC      x 2     COLONOSCOPY      KNEE ARTHROSCOPY W/ MENISCECTOMY Right     2023    OTHER SURGICAL HISTORY  09/23/2020    Cholecystectomy    OTHER SURGICAL HISTORY  09/23/2020    Tonsillectomy with adenoidectomy    OTHER SURGICAL HISTORY  09/23/2020    Oophorectomy    OTHER SURGICAL HISTORY  09/23/2020    Foot surgery    OTHER SURGICAL HISTORY  09/23/2020    Cataract surgery congenital    OTHER SURGICAL HISTORY  02/10/2022    Hysterectomy     Family History   Problem Relation Name Age of Onset    Diabetes Mother      Mirela-Danlos syndrome Mother      Prostate cancer Father      Other (htn) Father      Diabetes Father       Social History     Tobacco Use    Smoking status: Every Day     Current packs/day: 0.50     Average packs/day: 0.5 packs/day for 25.0 years (12.5 ttl pk-yrs)     Types: Cigarettes    Smokeless tobacco: Never   Vaping Use    Vaping status: Never Used   Substance Use Topics    Alcohol use: Never    Drug use: Never       Physical Exam   ED Triage Vitals [01/29/25 1754]   Temperature Heart Rate Respirations BP   36.3 °C (97.3 °F) 96 18 (!) 179/105      SpO2 Temp Source Heart Rate Source Patient Position   -- Tympanic Monitor --      BP Location FiO2 (%)     -- --       Physical Exam  Vitals and nursing note reviewed.   HENT:      Head: Normocephalic.      Right Ear: Tympanic membrane and external ear normal.      Left Ear: Tympanic membrane and external ear normal.      Nose: Nose normal.      Mouth/Throat:      Comments: The patient has an extracted right lower first molar.  No evidence of dry socket.  The gumline is mildly inflamed.  No bleeding.    The patient has mild swelling noted to the right lower jaw.  No induration appreciated.  Eyes:      Conjunctiva/sclera: Conjunctivae normal.      Pupils: Pupils are equal, round, and reactive to light.   Cardiovascular:      Rate and Rhythm: Normal rate.      Pulses: Normal pulses.   Pulmonary:      Effort: Pulmonary effort is normal.      Breath sounds: Normal breath  sounds.   Musculoskeletal:         General: Normal range of motion.   Lymphadenopathy:      Cervical: Cervical adenopathy present.   Skin:     General: Skin is warm.      Capillary Refill: Capillary refill takes less than 2 seconds.   Neurological:      General: No focal deficit present.      Mental Status: She is alert.   Psychiatric:         Mood and Affect: Mood normal.           ED Course & MDM   Diagnoses as of 01/29/25 4902   Facial swelling                 No data recorded     [facility] Coma Scale Score: 15 (01/29/25 1755 : Madeline Kang RN)                           Medical Decision Making  The patient was seen and evaluated by the nurse practitioner, Jennifer Crowder.  The patient is presenting to the emergency room complaints of right jaw swelling.  Differential diagnosis includes dental infection, dry socket, lymphadenitis, lymphoma, cellulitis, or other acute process.  A saline lock was established.  Laboratory studies were drawn with results as noted.  The patient was administered 15 mg of ketorolac IV.  The patient's laboratory studies were largely unremarkable.  The patient does not have an acute leukocytosis or lactic acidosis.  C-reactive protein and sed rate were negative.  A CT of the soft tissues of the neck showed a tooth extraction cavity with a small focus of cortical erosion at the outer table of the mandible with minimal if any adjacent gingival buccal soft tissue swelling possibly reactive without evidence of discrete fluid collection or soft tissue gas.  There was no evidence of lymphadenopathy.  Partial opacification of the maxillary sinuses bilaterally.  The patient is not experiencing any cold-like symptoms.  The patient was notified of the imaging results.  The patient was placed on Augmentin for antibiotic coverage.  She is to follow-up with her dentist in 2 to 3 days.  Patient was also provided ENT referral.  The patient is to use Tylenol and/or Motrin over-the-counter as directed.   Patient was discharged in stable condition with computer discharge instructions given.  She is to return if worse in any way.        Procedure  Procedures     Jennifer Crowder, RUDI-AIDE  01/29/25 9145

## 2025-02-01 ENCOUNTER — HOSPITAL ENCOUNTER (EMERGENCY)
Facility: HOSPITAL | Age: 50
Discharge: HOME | End: 2025-02-01
Payer: COMMERCIAL

## 2025-02-01 ENCOUNTER — APPOINTMENT (OUTPATIENT)
Dept: RADIOLOGY | Facility: HOSPITAL | Age: 50
End: 2025-02-01
Payer: COMMERCIAL

## 2025-02-01 VITALS
SYSTOLIC BLOOD PRESSURE: 169 MMHG | TEMPERATURE: 96.8 F | HEIGHT: 63 IN | DIASTOLIC BLOOD PRESSURE: 91 MMHG | WEIGHT: 290 LBS | HEART RATE: 82 BPM | RESPIRATION RATE: 18 BRPM | BODY MASS INDEX: 51.38 KG/M2 | OXYGEN SATURATION: 98 %

## 2025-02-01 DIAGNOSIS — R68.84 JAW PAIN: Primary | ICD-10-CM

## 2025-02-01 LAB
ALBUMIN SERPL BCP-MCNC: 3.9 G/DL (ref 3.4–5)
ALP SERPL-CCNC: 60 U/L (ref 33–110)
ALT SERPL W P-5'-P-CCNC: 13 U/L (ref 7–45)
ANION GAP SERPL CALC-SCNC: 12 MMOL/L (ref 10–20)
AST SERPL W P-5'-P-CCNC: 11 U/L (ref 9–39)
BASOPHILS # BLD AUTO: 0.1 X10*3/UL (ref 0–0.1)
BASOPHILS NFR BLD AUTO: 1.2 %
BILIRUB SERPL-MCNC: 0.3 MG/DL (ref 0–1.2)
BUN SERPL-MCNC: 17 MG/DL (ref 6–23)
CALCIUM SERPL-MCNC: 9.6 MG/DL (ref 8.6–10.3)
CHLORIDE SERPL-SCNC: 104 MMOL/L (ref 98–107)
CO2 SERPL-SCNC: 25 MMOL/L (ref 21–32)
CREAT SERPL-MCNC: 0.76 MG/DL (ref 0.5–1.05)
EGFRCR SERPLBLD CKD-EPI 2021: >90 ML/MIN/1.73M*2
EOSINOPHIL # BLD AUTO: 0.31 X10*3/UL (ref 0–0.7)
EOSINOPHIL NFR BLD AUTO: 3.8 %
ERYTHROCYTE [DISTWIDTH] IN BLOOD BY AUTOMATED COUNT: 13.7 % (ref 11.5–14.5)
GLUCOSE SERPL-MCNC: 100 MG/DL (ref 74–99)
HCT VFR BLD AUTO: 46.4 % (ref 36–46)
HGB BLD-MCNC: 15.1 G/DL (ref 12–16)
IMM GRANULOCYTES # BLD AUTO: 0.04 X10*3/UL (ref 0–0.7)
IMM GRANULOCYTES NFR BLD AUTO: 0.5 % (ref 0–0.9)
LYMPHOCYTES # BLD AUTO: 2.43 X10*3/UL (ref 1.2–4.8)
LYMPHOCYTES NFR BLD AUTO: 29.7 %
MAGNESIUM SERPL-MCNC: 1.93 MG/DL (ref 1.6–2.4)
MCH RBC QN AUTO: 29.9 PG (ref 26–34)
MCHC RBC AUTO-ENTMCNC: 32.5 G/DL (ref 32–36)
MCV RBC AUTO: 92 FL (ref 80–100)
MONOCYTES # BLD AUTO: 0.59 X10*3/UL (ref 0.1–1)
MONOCYTES NFR BLD AUTO: 7.2 %
NEUTROPHILS # BLD AUTO: 4.71 X10*3/UL (ref 1.2–7.7)
NEUTROPHILS NFR BLD AUTO: 57.6 %
NRBC BLD-RTO: 0 /100 WBCS (ref 0–0)
PLATELET # BLD AUTO: 309 X10*3/UL (ref 150–450)
POTASSIUM SERPL-SCNC: 4.3 MMOL/L (ref 3.5–5.3)
PROT SERPL-MCNC: 7 G/DL (ref 6.4–8.2)
RBC # BLD AUTO: 5.05 X10*6/UL (ref 4–5.2)
SODIUM SERPL-SCNC: 137 MMOL/L (ref 136–145)
WBC # BLD AUTO: 8.2 X10*3/UL (ref 4.4–11.3)

## 2025-02-01 PROCEDURE — 70491 CT SOFT TISSUE NECK W/DYE: CPT

## 2025-02-01 PROCEDURE — 70450 CT HEAD/BRAIN W/O DYE: CPT | Performed by: STUDENT IN AN ORGANIZED HEALTH CARE EDUCATION/TRAINING PROGRAM

## 2025-02-01 PROCEDURE — 83735 ASSAY OF MAGNESIUM: CPT | Performed by: PHYSICIAN ASSISTANT

## 2025-02-01 PROCEDURE — 2500000004 HC RX 250 GENERAL PHARMACY W/ HCPCS (ALT 636 FOR OP/ED): Performed by: PHYSICIAN ASSISTANT

## 2025-02-01 PROCEDURE — 99285 EMERGENCY DEPT VISIT HI MDM: CPT | Mod: 25

## 2025-02-01 PROCEDURE — 84075 ASSAY ALKALINE PHOSPHATASE: CPT | Performed by: PHYSICIAN ASSISTANT

## 2025-02-01 PROCEDURE — 96361 HYDRATE IV INFUSION ADD-ON: CPT

## 2025-02-01 PROCEDURE — 70450 CT HEAD/BRAIN W/O DYE: CPT

## 2025-02-01 PROCEDURE — 85025 COMPLETE CBC W/AUTO DIFF WBC: CPT | Performed by: PHYSICIAN ASSISTANT

## 2025-02-01 PROCEDURE — 2550000001 HC RX 255 CONTRASTS: Performed by: PHYSICIAN ASSISTANT

## 2025-02-01 PROCEDURE — 96374 THER/PROPH/DIAG INJ IV PUSH: CPT

## 2025-02-01 PROCEDURE — 36415 COLL VENOUS BLD VENIPUNCTURE: CPT | Performed by: PHYSICIAN ASSISTANT

## 2025-02-01 PROCEDURE — 96375 TX/PRO/DX INJ NEW DRUG ADDON: CPT

## 2025-02-01 PROCEDURE — 70491 CT SOFT TISSUE NECK W/DYE: CPT | Performed by: RADIOLOGY

## 2025-02-01 RX ORDER — KETOROLAC TROMETHAMINE 15 MG/ML
15 INJECTION, SOLUTION INTRAMUSCULAR; INTRAVENOUS ONCE
Status: COMPLETED | OUTPATIENT
Start: 2025-02-01 | End: 2025-02-01

## 2025-02-01 RX ORDER — DEXAMETHASONE 6 MG/1
6 TABLET ORAL 3 TIMES DAILY
Qty: 9 TABLET | Refills: 0 | Status: SHIPPED | OUTPATIENT
Start: 2025-02-01 | End: 2025-02-03 | Stop reason: SDUPTHER

## 2025-02-01 RX ORDER — DEXAMETHASONE SODIUM PHOSPHATE 10 MG/ML
10 INJECTION INTRAMUSCULAR; INTRAVENOUS ONCE
Status: COMPLETED | OUTPATIENT
Start: 2025-02-01 | End: 2025-02-01

## 2025-02-01 RX ADMIN — KETOROLAC TROMETHAMINE 15 MG: 15 INJECTION, SOLUTION INTRAMUSCULAR; INTRAVENOUS at 11:31

## 2025-02-01 RX ADMIN — SODIUM CHLORIDE 1000 ML: 9 INJECTION, SOLUTION INTRAVENOUS at 11:32

## 2025-02-01 RX ADMIN — IOHEXOL 75 ML: 350 INJECTION, SOLUTION INTRAVENOUS at 11:56

## 2025-02-01 RX ADMIN — DEXAMETHASONE SODIUM PHOSPHATE 10 MG: 10 INJECTION, SOLUTION INTRAMUSCULAR; INTRAVENOUS at 11:31

## 2025-02-01 ASSESSMENT — LIFESTYLE VARIABLES
TOTAL SCORE: 0
EVER FELT BAD OR GUILTY ABOUT YOUR DRINKING: NO
HAVE PEOPLE ANNOYED YOU BY CRITICIZING YOUR DRINKING: NO
EVER HAD A DRINK FIRST THING IN THE MORNING TO STEADY YOUR NERVES TO GET RID OF A HANGOVER: NO
HAVE YOU EVER FELT YOU SHOULD CUT DOWN ON YOUR DRINKING: NO

## 2025-02-01 ASSESSMENT — COLUMBIA-SUICIDE SEVERITY RATING SCALE - C-SSRS
1. IN THE PAST MONTH, HAVE YOU WISHED YOU WERE DEAD OR WISHED YOU COULD GO TO SLEEP AND NOT WAKE UP?: NO
2. HAVE YOU ACTUALLY HAD ANY THOUGHTS OF KILLING YOURSELF?: NO
6. HAVE YOU EVER DONE ANYTHING, STARTED TO DO ANYTHING, OR PREPARED TO DO ANYTHING TO END YOUR LIFE?: NO

## 2025-02-01 ASSESSMENT — PAIN SCALES - GENERAL: PAINLEVEL_OUTOF10: 9

## 2025-02-01 ASSESSMENT — PAIN - FUNCTIONAL ASSESSMENT: PAIN_FUNCTIONAL_ASSESSMENT: 0-10

## 2025-02-01 ASSESSMENT — PAIN DESCRIPTION - ORIENTATION: ORIENTATION: RIGHT

## 2025-02-01 ASSESSMENT — PAIN DESCRIPTION - LOCATION: LOCATION: FACE

## 2025-02-01 NOTE — ED PROVIDER NOTES
HPI   Chief Complaint   Patient presents with    Facial Swelling     Last week had a tooth abscess on the right lower side finished her ATB from dentist. Monday tooth was pulled. Felt that the lump on her face was growing larger so went to [place] who placed her on new ATB has been on new ATB for 3 days but doesn't feel like it is helping. Cannot lay down due to pounding in right side of head        This is a 49-year-old female with PMH fibromyalgia, Mirela-Danlos, depression, recent right lower molar extraction this past Monday presenting for evaluation of right lower mandible pain radiating down the right side of the neck.  States she has a headache when she lies in bed and turns her head as well.  Completed clindamycin and is now on Augmentin.  States the pain radiates down to her neck and sometimes she has pain with swallowing but is able to swallow.  Denies difficulty swallowing secretions.  Denies difficulty breathing, wheezing, fevers, chills, chest pain, shortness of breath, photophobia, neck stiffness, abdominal pain, vomiting.      History provided by:  Patient   used: No            Patient History   Past Medical History:   Diagnosis Date    Acquired clubfoot, right foot     Acquired bilateral club feet    Anxiety     Asthma     Dental disease     chipped x 2    Depression     Easy bruising     Fibromyalgia, primary     Interstitial cystitis (chronic) without hematuria     IC (interstitial cystitis)    Joint pain     Personal history of other diseases of the nervous system and sense organs     History of cataract    Personal history of other diseases of the respiratory system     History of allergic rhinitis    Vision loss      Past Surgical History:   Procedure Laterality Date     SECTION, CLASSIC      x 2    COLONOSCOPY      KNEE ARTHROSCOPY W/ MENISCECTOMY Right         OTHER SURGICAL HISTORY  2020    Cholecystectomy    OTHER SURGICAL HISTORY  2020     Tonsillectomy with adenoidectomy    OTHER SURGICAL HISTORY  09/23/2020    Oophorectomy    OTHER SURGICAL HISTORY  09/23/2020    Foot surgery    OTHER SURGICAL HISTORY  09/23/2020    Cataract surgery congenital    OTHER SURGICAL HISTORY  02/10/2022    Hysterectomy     Family History   Problem Relation Name Age of Onset    Diabetes Mother      Mirela-Danlos syndrome Mother      Prostate cancer Father      Other (htn) Father      Diabetes Father       Social History     Tobacco Use    Smoking status: Every Day     Current packs/day: 0.50     Average packs/day: 0.5 packs/day for 25.0 years (12.5 ttl pk-yrs)     Types: Cigarettes    Smokeless tobacco: Never   Vaping Use    Vaping status: Never Used   Substance Use Topics    Alcohol use: Never    Drug use: Never       Physical Exam   ED Triage Vitals [02/01/25 1045]   Temperature Heart Rate Respirations BP   36 °C (96.8 °F) 89 18 (!) 174/115      Pulse Ox Temp Source Heart Rate Source Patient Position   100 % Skin Monitor Sitting      BP Location FiO2 (%)     Left arm --       Physical Exam    Gen.: Vitals noted no distress afebrile. Normal phonation, no stridor, no trismus.  High BMI.  ENT: Fair dentition. Floor of the mouth is soft. No obvious abscess. Posterior oropharynx patent, noninjected, no exudate. Uvula midline.  Neck: No lymphadenopathy.   Cardiac: Regular rate and rhythm. No murmur.  Lungs: Good aeration throughout. No adventitious breath sounds.     ED Course & MDM   Diagnoses as of 02/01/25 1320   Jaw pain                 No data recorded     [facility] Coma Scale Score: 15 (02/01/25 1047 : Juliano Burciaga RN)                           Medical Decision Making  DDx: odontalgia, gingival abscess, periapical abscess    Patient has no induration or swelling of the floor of the mouth.  No pain out of proportion.  No crepitus.  No induration of the neck.  I have low suspicion for Eugene's angina.  I have low suspicion for Lemierre's syndrome.  Is stable  hemodynamically.  Visibly nontoxic-appearing no apparent distress.  Normal phonation.  No stridor or trismus.  Midline uvula.  CT and laboratory evaluation reassuring was given IV normal saline 1 L Decadron 10 was IV Toradol 15 mg IV and pain is proved.  Continue Augmentin.  Follow-up with dentistry.  Given prescription for Decadron for 3 days.  Instructed to return to the nearest ED if any concerns or new or worsening symptoms. Patient verbalized understanding and agreement with plan. Discharged in stable condition.      Disclaimer: This note was dictated using speech recognition software. An attempt at proofreading was made to minimize errors. Minor errors in transcription may be present. Please call if questions.    Amount and/or Complexity of Data Reviewed  Labs: ordered.  Radiology: ordered.        Procedure  Procedures     Kush Queen PA-C  02/01/25 9056

## 2025-02-01 NOTE — ED TRIAGE NOTES
Patient here for facial swelling Last week had a tooth abscess on the right lower side finished her ATB from dentist. Monday tooth was pulled. Felt that the lump on her face was growing larger so went to [place] who placed her on new ATB has been on new ATB for 3 days but doesn't feel like it is helping. Cannot lay down due to pounding in right side of head

## 2025-02-03 ENCOUNTER — APPOINTMENT (OUTPATIENT)
Dept: PRIMARY CARE | Facility: CLINIC | Age: 50
End: 2025-02-03
Payer: COMMERCIAL

## 2025-02-03 VITALS
HEART RATE: 86 BPM | HEIGHT: 63 IN | BODY MASS INDEX: 51.91 KG/M2 | DIASTOLIC BLOOD PRESSURE: 80 MMHG | SYSTOLIC BLOOD PRESSURE: 110 MMHG | WEIGHT: 293 LBS

## 2025-02-03 DIAGNOSIS — R68.84 JAW PAIN: ICD-10-CM

## 2025-02-03 PROCEDURE — 3008F BODY MASS INDEX DOCD: CPT | Performed by: CLINICAL NURSE SPECIALIST

## 2025-02-03 PROCEDURE — 99213 OFFICE O/P EST LOW 20 MIN: CPT | Performed by: CLINICAL NURSE SPECIALIST

## 2025-02-03 RX ORDER — DEXAMETHASONE 6 MG/1
6 TABLET ORAL 3 TIMES DAILY
Qty: 9 TABLET | Refills: 0 | Status: SHIPPED | OUTPATIENT
Start: 2025-02-03 | End: 2025-02-06

## 2025-02-03 ASSESSMENT — ENCOUNTER SYMPTOMS
BLOOD IN STOOL: 0
MYALGIAS: 0
COUGH: 0
POLYDIPSIA: 0
CHILLS: 0
TROUBLE SWALLOWING: 0
UNEXPECTED WEIGHT CHANGE: 0
ARTHRALGIAS: 0
DYSURIA: 0
CONSTIPATION: 0
ACTIVITY CHANGE: 0
FEVER: 0
PALPITATIONS: 0
SHORTNESS OF BREATH: 0
WHEEZING: 0
JOINT SWELLING: 0
NAUSEA: 0
HEADACHES: 0
APPETITE CHANGE: 0
OCCASIONAL FEELINGS OF UNSTEADINESS: 0
VOMITING: 0
NECK PAIN: 0
SEIZURES: 0
WOUND: 0
FATIGUE: 0
ABDOMINAL PAIN: 0
DIARRHEA: 0
FLANK PAIN: 0
CONFUSION: 0
DEPRESSION: 0
HEMATURIA: 0
LOSS OF SENSATION IN FEET: 0
BRUISES/BLEEDS EASILY: 0
SORE THROAT: 0
CHEST TIGHTNESS: 0
BACK PAIN: 0
SLEEP DISTURBANCE: 0
DIZZINESS: 0
EYE PAIN: 0
PHOTOPHOBIA: 0

## 2025-02-03 NOTE — PROGRESS NOTES
Subjective   Patient ID: Yolanda Kumar is a 49 y.o. female who presents for Follow-up (Er follow up Infection in [place] ).  HPI    This is a 49-year-old female with PMH fibromyalgia, Mirela-Danlos, depression, recent right lower molar extraction presenting for evaluation of right lower mandible pain radiating down the right side of the neck. States she has a headache when she lies in bed and turns her head as well. Completed clindamycin and is now on Augmentin. States the pain radiates down to her neck and sometimes she has pain with swallowing but is able to swallow. Denies difficulty swallowing secretions. Denies difficulty breathing, wheezing, fevers, chills, chest pain, shortness of breath, photophobia, neck stiffness, abdominal pain, vomiting.     Initial infection was switched to Augmentin in the ER. Extension was worse. Went back to the ER on 02/01/2025. Repeat CT completed at the ER with a CT of the Head. Stable. Ibuprofen/Acetaminophen for pain. Augmentin on day 5. Steroids for 3 days from the ER. Some benefit but persistent.     Previously had trouble tolerating Wellbutrin and Naltrexone. Did not restart after causing GI upset. Will attempt after completion of Antibiotic.     Review of Systems   Constitutional:  Negative for activity change, appetite change, chills, fatigue, fever and unexpected weight change.   HENT:  Negative for ear pain, hearing loss, nosebleeds, sore throat, tinnitus and trouble swallowing.    Eyes:  Negative for photophobia, pain and visual disturbance.   Respiratory:  Negative for cough, chest tightness, shortness of breath and wheezing.    Cardiovascular:  Negative for chest pain, palpitations and leg swelling.   Gastrointestinal:  Negative for abdominal pain, blood in stool, constipation, diarrhea, nausea and vomiting.   Endocrine: Negative for cold intolerance, heat intolerance, polydipsia and polyuria.   Genitourinary:  Negative for dysuria, flank pain and hematuria.    Musculoskeletal:  Negative for arthralgias, back pain, joint swelling, myalgias and neck pain.   Skin:  Negative for pallor, rash and wound.   Allergic/Immunologic: Negative for immunocompromised state.   Neurological:  Negative for dizziness, seizures and headaches.   Hematological:  Does not bruise/bleed easily.   Psychiatric/Behavioral:  Negative for confusion and sleep disturbance.        Objective   Physical Exam  Vitals and nursing note reviewed.   Constitutional:       General: She is not in acute distress.     Appearance: Normal appearance.   HENT:      Head: Normocephalic.      Nose: Nose normal.   Eyes:      Conjunctiva/sclera: Conjunctivae normal.   Neck:      Vascular: No carotid bruit.   Cardiovascular:      Rate and Rhythm: Normal rate and regular rhythm.      Pulses: Normal pulses.      Heart sounds: Normal heart sounds.   Pulmonary:      Effort: Pulmonary effort is normal.      Breath sounds: Normal breath sounds.   Abdominal:      General: Bowel sounds are normal.      Palpations: Abdomen is soft.   Musculoskeletal:         General: Normal range of motion.      Cervical back: Normal range of motion.   Skin:     General: Skin is warm and dry.   Neurological:      Mental Status: She is alert and oriented to person, place, and time. Mental status is at baseline.   Psychiatric:         Mood and Affect: Mood normal.         Behavior: Behavior normal.         Assessment/Plan       Jaw Pain, Facial Swelling: Reviewed ER report with patient and diagnostic testing. Complete Antibiotic. Continue Steroids. Consider Oral Surgeon pending response.     Routine follow up in 1 month for Chronic management.        RUDI Ramirez-CNS 02/03/25 2:33 PM

## 2025-03-06 ENCOUNTER — APPOINTMENT (OUTPATIENT)
Dept: PRIMARY CARE | Facility: CLINIC | Age: 50
End: 2025-03-06
Payer: COMMERCIAL

## 2025-03-18 ENCOUNTER — APPOINTMENT (OUTPATIENT)
Dept: PRIMARY CARE | Facility: CLINIC | Age: 50
End: 2025-03-18
Payer: COMMERCIAL

## 2025-03-18 VITALS
SYSTOLIC BLOOD PRESSURE: 118 MMHG | DIASTOLIC BLOOD PRESSURE: 60 MMHG | WEIGHT: 293 LBS | HEIGHT: 63 IN | HEART RATE: 100 BPM | BODY MASS INDEX: 51.91 KG/M2

## 2025-03-18 DIAGNOSIS — R68.84 JAW PAIN: ICD-10-CM

## 2025-03-18 DIAGNOSIS — J45.909 MILD ASTHMA WITHOUT COMPLICATION, UNSPECIFIED WHETHER PERSISTENT (HHS-HCC): ICD-10-CM

## 2025-03-18 DIAGNOSIS — G43.709 CHRONIC MIGRAINE WITHOUT AURA WITHOUT STATUS MIGRAINOSUS, NOT INTRACTABLE: Primary | ICD-10-CM

## 2025-03-18 DIAGNOSIS — Q79.60 EHLERS-DANLOS SYNDROME (HHS-HCC): ICD-10-CM

## 2025-03-18 PROCEDURE — 99214 OFFICE O/P EST MOD 30 MIN: CPT | Performed by: CLINICAL NURSE SPECIALIST

## 2025-03-18 PROCEDURE — 3008F BODY MASS INDEX DOCD: CPT | Performed by: CLINICAL NURSE SPECIALIST

## 2025-03-18 RX ORDER — TIRZEPATIDE 2.5 MG/.5ML
2.5 INJECTION, SOLUTION SUBCUTANEOUS
COMMUNITY

## 2025-03-18 RX ORDER — GALCANEZUMAB 120 MG/ML
120 INJECTION, SOLUTION SUBCUTANEOUS
Qty: 12 EACH | Refills: 11 | Status: SHIPPED | OUTPATIENT
Start: 2025-03-18

## 2025-03-18 RX ORDER — BUTALBITAL, ACETAMINOPHEN AND CAFFEINE 300; 40; 50 MG/1; MG/1; MG/1
1 CAPSULE ORAL DAILY PRN
Qty: 14 CAPSULE | Refills: 0 | Status: SHIPPED | OUTPATIENT
Start: 2025-03-18

## 2025-03-18 ASSESSMENT — ENCOUNTER SYMPTOMS
SHORTNESS OF BREATH: 0
WHEEZING: 0
BLOOD IN STOOL: 0
TROUBLE SWALLOWING: 0
EYE PAIN: 0
BRUISES/BLEEDS EASILY: 0
SLEEP DISTURBANCE: 0
DYSURIA: 0
DIARRHEA: 0
UNEXPECTED WEIGHT CHANGE: 0
JOINT SWELLING: 0
ARTHRALGIAS: 0
SEIZURES: 0
PHOTOPHOBIA: 0
CONSTIPATION: 0
ACTIVITY CHANGE: 0
COUGH: 0
POLYDIPSIA: 0
NECK PAIN: 0
CONFUSION: 0
FATIGUE: 0
MYALGIAS: 0
ABDOMINAL PAIN: 0
APPETITE CHANGE: 0
DEPRESSION: 0
NAUSEA: 0
FLANK PAIN: 0
LOSS OF SENSATION IN FEET: 0
DIZZINESS: 0
VOMITING: 0
CHEST TIGHTNESS: 0
BACK PAIN: 0
PALPITATIONS: 0
CHILLS: 0
HEADACHES: 1
OCCASIONAL FEELINGS OF UNSTEADINESS: 0
WOUND: 0
HEMATURIA: 0
FEVER: 0
SORE THROAT: 0

## 2025-03-18 NOTE — PROGRESS NOTES
Subjective   Patient ID: Yolanda Kumar is a 49 y.o. female who presents for Follow-up (Follow up jaw pain/Does not have any pain ).  HPI    Here today as a follow up appointment. Has been stable on medications.      Follows with Dr. Aguilar for Rheumatology.     PMH fibromyalgia, Mirela-Danlos, depression, recent right lower molar extraction presenting for evaluation of right lower mandible pain radiating down the right side of the neck. States she has a headache when she lies in bed and turns her head as well. Completed clindamycin and Augmentin. States the pain radiates down to her neck and sometimes she has pain with swallowing but is able to swallow. Denies difficulty swallowing secretions. Denies difficulty breathing, wheezing, fevers, chills, chest pain, shortness of breath, photophobia, neck stiffness, abdominal pain, vomiting.      Initial infection was switched to Augmentin in the ER. Extension was worse. Went back to the ER on 02/01/2025. Repeat CT completed at the ER with a CT of the Head. Stable. Ibuprofen/Acetaminophen for pain. Completed Augmentin and two rounds of Antibiotics. Pain has resolved.      Previously had trouble tolerating Wellbutrin and Naltrexone. Did not restart after causing GI upset. Concerned for her weight. Trouble tolerating medications.     Migraines, no longer well controlled on Nurtec. Amitriptyline without any benefit. Using Fioricet PRN. Still getting Migraines 3-5x a week. States that she does not function well with them. Nauseated. Imitrex in the past. Had trouble getting approval for additional medication. On Topamax through Dr. Aguilar.     Review of Systems   Constitutional:  Negative for activity change, appetite change, chills, fatigue, fever and unexpected weight change.   HENT:  Negative for ear pain, hearing loss, nosebleeds, sore throat, tinnitus and trouble swallowing.    Eyes:  Negative for photophobia, pain and visual disturbance.   Respiratory:  Negative for  cough, chest tightness, shortness of breath and wheezing.    Cardiovascular:  Negative for chest pain, palpitations and leg swelling.   Gastrointestinal:  Negative for abdominal pain, blood in stool, constipation, diarrhea, nausea and vomiting.   Endocrine: Negative for cold intolerance, heat intolerance, polydipsia and polyuria.   Genitourinary:  Negative for dysuria, flank pain and hematuria.   Musculoskeletal:  Negative for arthralgias, back pain, joint swelling, myalgias and neck pain.   Skin:  Negative for pallor, rash and wound.   Allergic/Immunologic: Negative for immunocompromised state.   Neurological:  Positive for headaches. Negative for dizziness and seizures.   Hematological:  Does not bruise/bleed easily.   Psychiatric/Behavioral:  Negative for confusion and sleep disturbance.        Objective   Physical Exam  Vitals and nursing note reviewed.   Constitutional:       General: She is not in acute distress.     Appearance: Normal appearance.   HENT:      Head: Normocephalic.      Nose: Nose normal.   Eyes:      Conjunctiva/sclera: Conjunctivae normal.   Neck:      Vascular: No carotid bruit.   Cardiovascular:      Rate and Rhythm: Normal rate and regular rhythm.      Pulses: Normal pulses.      Heart sounds: Normal heart sounds.   Pulmonary:      Effort: Pulmonary effort is normal.      Breath sounds: Normal breath sounds.   Abdominal:      General: Bowel sounds are normal.      Palpations: Abdomen is soft.   Musculoskeletal:         General: Normal range of motion.      Cervical back: Normal range of motion.   Skin:     General: Skin is warm and dry.   Neurological:      Mental Status: She is alert and oriented to person, place, and time. Mental status is at baseline.   Psychiatric:         Mood and Affect: Mood normal.         Behavior: Behavior normal.       Assessment/Plan       Reviewed results of blood work completed with patient.       Vitamin B12 Deficiency: Vitamin B12 Injections with oral  Vitamin B12. Reevaluate with next lab work. Will adjust with Mounjaro.   Asthma: Respiratory status at baseline. Continue to monitor.   Mirela-Danlos, Fibromyalgia: Continue to follow with Rheumatology as previously determined.   Chronic Migraine: Uncontrolled. No improvement with Nurtec, Fioricet and Imitrex PRN. Currently on Topamax uncontrolled. CT recently completed, negative. Emgality prescribed.  Hyperlipidemia: Updated lab work ordered.   Vitamin D Deficiency: Vitamin D. Re[place]ute with next lab work.   Insulin Resistance: Updated lab work ordered.   Obesity: BMI: 57.75. Lifestyle changes recommended: Diet consisting of low fat foods, lean meats, high fiber, fresh fruits and vegetables. 150 min/ weekly aerobic exercise. Declined Bariatrics referral. Followed with Nutritionist. Sridhar.   Jaw Pain, Facial Swelling: Reviewed ER report with patient and diagnostic testing. Completed Antibiotic and Steroids. Resolved.      Mammogram: October 2023, negative per patient. New order placed.   Colonoscopy: September 2024, plan to repeat in 5 years.  Declined updated immunizations.      RUDI Ramirez-CNS 03/18/25 2:27 PM

## 2025-03-19 ENCOUNTER — TELEPHONE (OUTPATIENT)
Dept: PRIMARY CARE | Facility: CLINIC | Age: 50
End: 2025-03-19
Payer: COMMERCIAL

## 2025-03-19 RX ORDER — BUPROPION HYDROCHLORIDE 75 MG/1
TABLET ORAL
Refills: 0 | OUTPATIENT
Start: 2025-03-19

## 2025-03-19 NOTE — TELEPHONE ENCOUNTER
Prescription was already sent. If approved, have her stop the [facility]. Thank you!

## 2025-03-19 NOTE — TELEPHONE ENCOUNTER
Patient called in stating that she did get approval for this medication in the mail.  galcanezumab (Emgality Syringe) 120 mg/mL prefilled syringe [[id number]]    Order Details  Dose: 120 mg Route: subcutaneous Frequency: Every 30 days   Dispense Quantity: 12 each Refills: 11          Sig: Inject 1 Syringe (120 mg) under the skin every 30 (thirty) days.         Start Date: 03/18/25 End Date: --   Written Date: 03/18/25 Rx Expiration Date: 03/18/26        Associated Diagnoses: Chronic migraine without aura without status migrainosus, not intractable [G43.709]

## 2025-05-07 ENCOUNTER — PATIENT MESSAGE (OUTPATIENT)
Dept: PRIMARY CARE | Facility: CLINIC | Age: 50
End: 2025-05-07
Payer: COMMERCIAL

## 2025-05-07 DIAGNOSIS — R10.9 FLANK PAIN: ICD-10-CM

## 2025-05-08 LAB
APPEARANCE UR: CLEAR
BACTERIA #/AREA URNS HPF: NORMAL /HPF
BACTERIA UR CULT: NORMAL
BILIRUB UR QL STRIP: NEGATIVE
COLOR UR: YELLOW
GLUCOSE UR QL STRIP: NEGATIVE
HGB UR QL STRIP: NEGATIVE
HYALINE CASTS #/AREA URNS LPF: NORMAL /LPF
KETONES UR QL STRIP: NEGATIVE
LEUKOCYTE ESTERASE UR QL STRIP: NEGATIVE
NITRITE UR QL STRIP: NEGATIVE
PH UR STRIP: 6.5 [PH] (ref 5–8)
PROT UR QL STRIP: NEGATIVE
RBC #/AREA URNS HPF: NORMAL /HPF
SERVICE CMNT-IMP: NORMAL
SP GR UR STRIP: 1.01 (ref 1–1.03)
SQUAMOUS #/AREA URNS HPF: NORMAL /HPF
WBC #/AREA URNS HPF: NORMAL /HPF

## 2025-05-09 ENCOUNTER — HOSPITAL ENCOUNTER (EMERGENCY)
Facility: HOSPITAL | Age: 50
Discharge: HOME | End: 2025-05-09
Attending: STUDENT IN AN ORGANIZED HEALTH CARE EDUCATION/TRAINING PROGRAM
Payer: COMMERCIAL

## 2025-05-09 ENCOUNTER — PATIENT MESSAGE (OUTPATIENT)
Dept: PRIMARY CARE | Facility: CLINIC | Age: 50
End: 2025-05-09
Payer: COMMERCIAL

## 2025-05-09 ENCOUNTER — APPOINTMENT (OUTPATIENT)
Dept: RADIOLOGY | Facility: HOSPITAL | Age: 50
End: 2025-05-09
Payer: COMMERCIAL

## 2025-05-09 VITALS
BODY MASS INDEX: 51.91 KG/M2 | WEIGHT: 293 LBS | SYSTOLIC BLOOD PRESSURE: 118 MMHG | HEART RATE: 88 BPM | DIASTOLIC BLOOD PRESSURE: 94 MMHG | HEIGHT: 63 IN | TEMPERATURE: 97 F | RESPIRATION RATE: 15 BRPM | OXYGEN SATURATION: 96 %

## 2025-05-09 DIAGNOSIS — R10.11 RIGHT UPPER QUADRANT PAIN: Primary | ICD-10-CM

## 2025-05-09 LAB
ALBUMIN SERPL BCP-MCNC: 3.8 G/DL (ref 3.4–5)
ALP SERPL-CCNC: 58 U/L (ref 33–110)
ALT SERPL W P-5'-P-CCNC: 21 U/L (ref 7–45)
ANION GAP SERPL CALC-SCNC: 11 MMOL/L (ref 10–20)
APPEARANCE UR: CLEAR
AST SERPL W P-5'-P-CCNC: 16 U/L (ref 9–39)
BASOPHILS # BLD AUTO: 0.11 X10*3/UL (ref 0–0.1)
BASOPHILS NFR BLD AUTO: 1.5 %
BILIRUB SERPL-MCNC: 0.4 MG/DL (ref 0–1.2)
BILIRUB UR STRIP.AUTO-MCNC: NEGATIVE MG/DL
BUN SERPL-MCNC: 16 MG/DL (ref 6–23)
CALCIUM SERPL-MCNC: 8.5 MG/DL (ref 8.6–10.3)
CHLORIDE SERPL-SCNC: 105 MMOL/L (ref 98–107)
CO2 SERPL-SCNC: 24 MMOL/L (ref 21–32)
COLOR UR: ABNORMAL
CREAT SERPL-MCNC: 0.85 MG/DL (ref 0.5–1.05)
EGFRCR SERPLBLD CKD-EPI 2021: 84 ML/MIN/1.73M*2
EOSINOPHIL # BLD AUTO: 0.36 X10*3/UL (ref 0–0.7)
EOSINOPHIL NFR BLD AUTO: 4.8 %
ERYTHROCYTE [DISTWIDTH] IN BLOOD BY AUTOMATED COUNT: 13.3 % (ref 11.5–14.5)
GLUCOSE SERPL-MCNC: 90 MG/DL (ref 74–99)
GLUCOSE UR STRIP.AUTO-MCNC: NORMAL MG/DL
HCT VFR BLD AUTO: 43.5 % (ref 36–46)
HGB BLD-MCNC: 15 G/DL (ref 12–16)
IMM GRANULOCYTES # BLD AUTO: 0.02 X10*3/UL (ref 0–0.7)
IMM GRANULOCYTES NFR BLD AUTO: 0.3 % (ref 0–0.9)
KETONES UR STRIP.AUTO-MCNC: NEGATIVE MG/DL
LEUKOCYTE ESTERASE UR QL STRIP.AUTO: NEGATIVE
LIPASE SERPL-CCNC: 17 U/L (ref 9–82)
LYMPHOCYTES # BLD AUTO: 2.31 X10*3/UL (ref 1.2–4.8)
LYMPHOCYTES NFR BLD AUTO: 31 %
MCH RBC QN AUTO: 31.5 PG (ref 26–34)
MCHC RBC AUTO-ENTMCNC: 34.5 G/DL (ref 32–36)
MCV RBC AUTO: 91 FL (ref 80–100)
MONOCYTES # BLD AUTO: 0.55 X10*3/UL (ref 0.1–1)
MONOCYTES NFR BLD AUTO: 7.4 %
NEUTROPHILS # BLD AUTO: 4.1 X10*3/UL (ref 1.2–7.7)
NEUTROPHILS NFR BLD AUTO: 55 %
NITRITE UR QL STRIP.AUTO: NEGATIVE
NRBC BLD-RTO: 0 /100 WBCS (ref 0–0)
PH UR STRIP.AUTO: 5.5 [PH]
PLATELET # BLD AUTO: 297 X10*3/UL (ref 150–450)
POTASSIUM SERPL-SCNC: 4.3 MMOL/L (ref 3.5–5.3)
PROT SERPL-MCNC: 6.2 G/DL (ref 6.4–8.2)
PROT UR STRIP.AUTO-MCNC: NEGATIVE MG/DL
RBC # BLD AUTO: 4.76 X10*6/UL (ref 4–5.2)
RBC # UR STRIP.AUTO: NEGATIVE MG/DL
SODIUM SERPL-SCNC: 136 MMOL/L (ref 136–145)
SP GR UR STRIP.AUTO: 1.05
UROBILINOGEN UR STRIP.AUTO-MCNC: NORMAL MG/DL
WBC # BLD AUTO: 7.5 X10*3/UL (ref 4.4–11.3)

## 2025-05-09 PROCEDURE — 83690 ASSAY OF LIPASE: CPT

## 2025-05-09 PROCEDURE — 81003 URINALYSIS AUTO W/O SCOPE: CPT

## 2025-05-09 PROCEDURE — 96374 THER/PROPH/DIAG INJ IV PUSH: CPT | Mod: 59

## 2025-05-09 PROCEDURE — 99285 EMERGENCY DEPT VISIT HI MDM: CPT | Mod: 25 | Performed by: STUDENT IN AN ORGANIZED HEALTH CARE EDUCATION/TRAINING PROGRAM

## 2025-05-09 PROCEDURE — 74177 CT ABD & PELVIS W/CONTRAST: CPT

## 2025-05-09 PROCEDURE — 74177 CT ABD & PELVIS W/CONTRAST: CPT | Performed by: STUDENT IN AN ORGANIZED HEALTH CARE EDUCATION/TRAINING PROGRAM

## 2025-05-09 PROCEDURE — 85025 COMPLETE CBC W/AUTO DIFF WBC: CPT

## 2025-05-09 PROCEDURE — 2550000001 HC RX 255 CONTRASTS

## 2025-05-09 PROCEDURE — 96376 TX/PRO/DX INJ SAME DRUG ADON: CPT

## 2025-05-09 PROCEDURE — 96375 TX/PRO/DX INJ NEW DRUG ADDON: CPT

## 2025-05-09 PROCEDURE — 36415 COLL VENOUS BLD VENIPUNCTURE: CPT

## 2025-05-09 PROCEDURE — 84075 ASSAY ALKALINE PHOSPHATASE: CPT

## 2025-05-09 PROCEDURE — 2500000004 HC RX 250 GENERAL PHARMACY W/ HCPCS (ALT 636 FOR OP/ED)

## 2025-05-09 RX ORDER — HYDROMORPHONE HYDROCHLORIDE 1 MG/ML
1 INJECTION, SOLUTION INTRAMUSCULAR; INTRAVENOUS; SUBCUTANEOUS ONCE
Status: COMPLETED | OUTPATIENT
Start: 2025-05-09 | End: 2025-05-09

## 2025-05-09 RX ORDER — ACETAMINOPHEN 325 MG/1
650 TABLET ORAL EVERY 6 HOURS PRN
Qty: 60 TABLET | Refills: 0 | Status: SHIPPED | OUTPATIENT
Start: 2025-05-09

## 2025-05-09 RX ORDER — DICYCLOMINE HYDROCHLORIDE 20 MG/1
20 TABLET ORAL 2 TIMES DAILY
Qty: 20 TABLET | Refills: 0 | Status: SHIPPED | OUTPATIENT
Start: 2025-05-09 | End: 2025-05-19

## 2025-05-09 RX ORDER — ONDANSETRON 4 MG/1
4 TABLET, FILM COATED ORAL EVERY 6 HOURS
Qty: 12 TABLET | Refills: 0 | Status: SHIPPED | OUTPATIENT
Start: 2025-05-09 | End: 2025-05-13 | Stop reason: SDUPTHER

## 2025-05-09 RX ORDER — ONDANSETRON HYDROCHLORIDE 2 MG/ML
4 INJECTION, SOLUTION INTRAVENOUS ONCE
Status: COMPLETED | OUTPATIENT
Start: 2025-05-09 | End: 2025-05-09

## 2025-05-09 RX ADMIN — HYDROMORPHONE HYDROCHLORIDE 1 MG: 1 INJECTION, SOLUTION INTRAMUSCULAR; INTRAVENOUS; SUBCUTANEOUS at 16:18

## 2025-05-09 RX ADMIN — HYDROMORPHONE HYDROCHLORIDE 1 MG: 1 INJECTION, SOLUTION INTRAMUSCULAR; INTRAVENOUS; SUBCUTANEOUS at 19:38

## 2025-05-09 RX ADMIN — IOHEXOL 75 ML: 350 INJECTION, SOLUTION INTRAVENOUS at 17:28

## 2025-05-09 RX ADMIN — ONDANSETRON 4 MG: 2 INJECTION, SOLUTION INTRAMUSCULAR; INTRAVENOUS at 16:18

## 2025-05-09 ASSESSMENT — PAIN SCALES - GENERAL
PAINLEVEL_OUTOF10: 0 - NO PAIN
PAINLEVEL_OUTOF10: 5 - MODERATE PAIN
PAINLEVEL_OUTOF10: 8
PAINLEVEL_OUTOF10: 0 - NO PAIN
PAINLEVEL_OUTOF10: 9

## 2025-05-09 ASSESSMENT — LIFESTYLE VARIABLES
HAVE PEOPLE ANNOYED YOU BY CRITICIZING YOUR DRINKING: NO
TOTAL SCORE: 0
EVER FELT BAD OR GUILTY ABOUT YOUR DRINKING: NO
HAVE YOU EVER FELT YOU SHOULD CUT DOWN ON YOUR DRINKING: NO
EVER HAD A DRINK FIRST THING IN THE MORNING TO STEADY YOUR NERVES TO GET RID OF A HANGOVER: NO

## 2025-05-09 ASSESSMENT — PAIN DESCRIPTION - LOCATION: LOCATION: ABDOMEN

## 2025-05-09 ASSESSMENT — PAIN DESCRIPTION - DESCRIPTORS: DESCRIPTORS: SHARP

## 2025-05-09 ASSESSMENT — PAIN - FUNCTIONAL ASSESSMENT: PAIN_FUNCTIONAL_ASSESSMENT: 0-10

## 2025-05-09 ASSESSMENT — PAIN DESCRIPTION - PAIN TYPE: TYPE: ACUTE PAIN

## 2025-05-09 ASSESSMENT — PAIN DESCRIPTION - ORIENTATION: ORIENTATION: RIGHT;LOWER

## 2025-05-09 NOTE — ED PROVIDER NOTES
Emergency Department Provider Note          History of Present Illness     CC: Abdominal Pain (Sharp right sided flank pain that radiates the RLQ since Wednesday. Endorses nausea. Denies vomiting/diarrhea/fevers/urinary sx. )     History provided by: Patient  Limitations to History: None    HPI:   Yolanda Kumar is a 49 y.o.female with PMH Fibromyalgia, mirela danlos, presenting to the Emergency Department for abdominal pain.  Patient reports starting on Tuesday of this week, had sudden onset right lower quadrant pain that extended up to the right upper quadrant.  Notes it is 8 out of 10 and sharp in nature.  Associated nausea with no episodes of vomiting.  Intermittent loose stools during this time as well.  Endorses chills during this time.  No fevers recorded.  Denies chest pain, shortness of breath, or changes in urination.  Had an appendicitis flareup last year that felt similar to this but was not surgerized.     Records Reviewed: Recent available ED and inpatient notes reviewed in EMR.    PMHx/PSHx:  Per HPI.   - has a past medical history of Acquired clubfoot, right foot, Anxiety, Asthma, Dental disease, Depression, Easy bruising, Fibromyalgia, primary, Interstitial cystitis (chronic) without hematuria, Joint pain, Personal history of other diseases of the nervous system and sense organs, Personal history of other diseases of the respiratory system, and Vision loss.  - has a past surgical history that includes Other surgical history (2020); Other surgical history (2020); Other surgical history (2020); Other surgical history (2020); Other surgical history (2020); Other surgical history (02/10/2022);  section, classic; Knee arthroscopy w/ meniscectomy (Right); and Colonoscopy.  - has Acute pain of left knee; Anxiety; Back pain, chronic; Chronic headaches; Fibromyalgia; Mirela-Danlos syndrome (HHS-HCC); Hyperlipidemia; Insulin resistance; Low vitamin D level; Migraine  "headache; Mild intermittent asthma; Morbid obesity (Multi); Candidiasis of vagina; Knee pain; Ankle pain; Hyperglycemia; Chronic interstitial cystitis; and Acquired equinovarus deformity of both feet on their problem list.    Medications:  Current Outpatient Medications   Medication Instructions    acetaminophen (TYLENOL) 650 mg, oral, Every 6 hours PRN    albuterol 90 mcg/actuation inhaler 2 puffs, Every 4 hours PRN    butalbital-acetaminophen-caff (Fioricet) -40 mg capsule 1 capsule, oral, Daily PRN    cyanocobalamin (vitamin B-12) (VITAMIN B-12) 2,500 mcg, sublingual, Daily    cyanocobalamin (VITAMIN B-12) 1,000 mcg, intramuscular, Every 30 days    cyclobenzaprine (FLEXERIL) 5 mg, 2 times daily    dicyclomine (BENTYL) 20 mg, oral, 2 times daily    Emgality Syringe 120 mg, subcutaneous, Every 30 days    ergocalciferol (VITAMIN D-2) 50,000 Units, oral, 2 times weekly    gabapentin (NEURONTIN) 400 mg, 4 times daily    Mounjaro 2.5 mg, subcutaneous, Every 7 days    naproxen (NAPROSYN) 500 mg, 2 times daily (morning and late afternoon)    ondansetron (ZOFRAN) 4 mg, oral, Every 6 hours    syringe with needle 3 mL 21 gauge x 1\" syringe 1 each, miscellaneous, Every 30 days, Use for b12 injection    syringe with needle, safety 3 mL 20 gauge x 1 1/2\" syringe 1 each, miscellaneous, Every 30 days, Use for b12 injection    topiramate (Topamax) 50 mg tablet 1 tablet, Every 12 hours scheduled (0630,1830)        Allergies:  Influenza virus vaccines    Social History:  - Tobacco:  reports that she has been smoking cigarettes. She has a 12.5 pack-year smoking history. She has never used smokeless tobacco.   - Alcohol:  reports no history of alcohol use.   - Illicit Drugs:  reports no history of drug use.     ROS:  Per HPI.       Physical Exam     Triage Vitals:  T 36.1 °C (97 °F)  HR (!) 112  /78  RR 18  O2 98 % None (Room air)    General: Awake, alert, in no acute distress  Eyes: Gaze conjugate.  No scleral " icterus or injection  HENT: Normo-cephalic, atraumatic. No stridor  CV: Regular rate, regular rhythm. Radial pulses 2+ bilaterally  Resp: Breathing non-labored, speaking in full sentences.  Clear to auscultation bilaterally  GI: Obese.  Severe right lower quadrant tenderness to palpation extending up to the right upper quadrant without rebound or guarding.  MSK/Extremities: No gross bony deformities. Moving all extremities  Skin: Warm. Appropriate color  Neuro: Alert. Oriented. Face symmetric. Speech is fluent.  Gross strength and sensation intact in b/l UE and LEs  Psych: Appropriate mood and affect          Jossy Coma Scale Score: 15                    Medical Decision Making & ED Course     EKG: EKG interpreted by myself. If applicable, please see ED Course for full interpretation.    Medical Decision Making   Yolanda Kumar is a 49 y.o.female presenting to the Emergency Department for abdominal pain.  On arrival, tachycardic to 112, rest of vital signs within normal limits.  Afebrile for Espey on examination, patient appears uncomfortable.  Significant tenderness palpation of the right lower quadrant extending up to the right upper quadrant.  Will get basic labs, EKG, lipase, and CT of the abdomen pelvis for definitive appendicitis rule out today.  Symptomatic treated with Dilaudid and Zofran in the ED.    Update: Labs performed in the emergency department showed a white count of 7.5, lower concern for systemic infectious process.  Hemoglobin stable at 15.0, lower concern for acute blood loss anemia.  Chemistry relatively unremarkable.   No LFT elevations today.  Lower concern for cholecystitis picture.  Urinalysis negative for acute UTI.  CT of the abdomen pelvis showed no acute intra-abdominal pathology.  Colonic diverticula seen.  Believe the cause her symptoms today is gastroenteritis.  After Zofran and Dilaudid in the ED, patient feels symptomatically improved.  Able to tolerate p.o. for us.  Will be  discharged in stable condition with close PCP follow-up and strict return precautions.      Diagnoses as of 05/09/25 2058   Right upper quadrant pain       Independent Result Review and Interpretation: Relevant laboratory and radiographic results were reviewed and independently interpreted by myself.  As necessary, they are commented on in the ED Course.    Chronic conditions affecting the patient's care: As documented above in MDM.      Disposition   Discharge    Flash Bailey MD  Emergency Medicine PGY3      Procedures     Procedures ? SmartLinks last updated 5/9/2025 8:58 PM        Flash Bailey MD  Resident  05/09/25 2058

## 2025-05-10 LAB
25(OH)D3+25(OH)D2 SERPL-MCNC: 23 NG/ML (ref 30–100)
ALBUMIN SERPL-MCNC: 4.2 G/DL (ref 3.6–5.1)
ALP SERPL-CCNC: 77 U/L (ref 31–125)
ALT SERPL-CCNC: 22 U/L (ref 6–29)
ANION GAP SERPL CALCULATED.4IONS-SCNC: 11 MMOL/L (CALC) (ref 7–17)
AST SERPL-CCNC: 20 U/L (ref 10–35)
BILIRUB SERPL-MCNC: 0.3 MG/DL (ref 0.2–1.2)
BUN SERPL-MCNC: 16 MG/DL (ref 7–25)
CALCIUM SERPL-MCNC: 9.3 MG/DL (ref 8.6–10.2)
CHLORIDE SERPL-SCNC: 106 MMOL/L (ref 98–110)
CHOLEST SERPL-MCNC: 230 MG/DL
CHOLEST/HDLC SERPL: 4.9 (CALC)
CO2 SERPL-SCNC: 20 MMOL/L (ref 20–32)
CREAT SERPL-MCNC: 0.68 MG/DL (ref 0.5–0.99)
EGFRCR SERPLBLD CKD-EPI 2021: 107 ML/MIN/1.73M2
ERYTHROCYTE [DISTWIDTH] IN BLOOD BY AUTOMATED COUNT: 12.9 % (ref 11–15)
EST. AVERAGE GLUCOSE BLD GHB EST-MCNC: 111 MG/DL
EST. AVERAGE GLUCOSE BLD GHB EST-SCNC: 6.2 MMOL/L
GLUCOSE SERPL-MCNC: 71 MG/DL (ref 65–99)
HBA1C MFR BLD: 5.5 %
HCT VFR BLD AUTO: 45.4 % (ref 35–45)
HDLC SERPL-MCNC: 47 MG/DL
HGB BLD-MCNC: 14.8 G/DL (ref 11.7–15.5)
HOLD SPECIMEN: NORMAL
INSULIN SERPL-ACNC: 20.9 UIU/ML
LDLC SERPL CALC-MCNC: 155 MG/DL (CALC)
MCH RBC QN AUTO: 29.7 PG (ref 27–33)
MCHC RBC AUTO-ENTMCNC: 32.6 G/DL (ref 32–36)
MCV RBC AUTO: 91.2 FL (ref 80–100)
NONHDLC SERPL-MCNC: 183 MG/DL (CALC)
PLATELET # BLD AUTO: 334 THOUSAND/UL (ref 140–400)
PMV BLD REES-ECKER: 11.1 FL (ref 7.5–12.5)
POTASSIUM SERPL-SCNC: 5.2 MMOL/L (ref 3.5–5.3)
PROT SERPL-MCNC: 6.7 G/DL (ref 6.1–8.1)
RBC # BLD AUTO: 4.98 MILLION/UL (ref 3.8–5.1)
SODIUM SERPL-SCNC: 137 MMOL/L (ref 135–146)
TRIGL SERPL-MCNC: 149 MG/DL
TSH SERPL-ACNC: 2.59 MIU/L
VIT B12 SERPL-MCNC: 562 PG/ML (ref 200–1100)
WBC # BLD AUTO: 8 THOUSAND/UL (ref 3.8–10.8)

## 2025-05-12 ENCOUNTER — TELEPHONE (OUTPATIENT)
Dept: PRIMARY CARE | Facility: CLINIC | Age: 50
End: 2025-05-12
Payer: COMMERCIAL

## 2025-05-12 NOTE — TELEPHONE ENCOUNTER
Ok to schedule acute, not sure if I have any this week, may be next week unless there is another provider with openings?

## 2025-05-12 NOTE — TELEPHONE ENCOUNTER
Patient called and needed an ED follow up  from a Friday visit patent had inflamation in GI and mass on adrenal gland no immediate openings please advise

## 2025-05-13 ENCOUNTER — OFFICE VISIT (OUTPATIENT)
Dept: PRIMARY CARE | Facility: CLINIC | Age: 50
End: 2025-05-13
Payer: COMMERCIAL

## 2025-05-13 VITALS
HEIGHT: 63 IN | BODY MASS INDEX: 51.91 KG/M2 | SYSTOLIC BLOOD PRESSURE: 120 MMHG | WEIGHT: 293 LBS | DIASTOLIC BLOOD PRESSURE: 80 MMHG | HEART RATE: 92 BPM

## 2025-05-13 DIAGNOSIS — R10.11 RIGHT UPPER QUADRANT PAIN: ICD-10-CM

## 2025-05-13 DIAGNOSIS — E27.9 ADRENAL NODULE: Primary | ICD-10-CM

## 2025-05-13 DIAGNOSIS — R11.0 NAUSEA: ICD-10-CM

## 2025-05-13 PROCEDURE — 99214 OFFICE O/P EST MOD 30 MIN: CPT | Performed by: CLINICAL NURSE SPECIALIST

## 2025-05-13 PROCEDURE — 3008F BODY MASS INDEX DOCD: CPT | Performed by: CLINICAL NURSE SPECIALIST

## 2025-05-13 RX ORDER — ONDANSETRON 4 MG/1
4 TABLET, FILM COATED ORAL EVERY 6 HOURS
Qty: 12 TABLET | Refills: 0 | Status: SHIPPED | OUTPATIENT
Start: 2025-05-13 | End: 2025-05-16 | Stop reason: WASHOUT

## 2025-05-13 RX ORDER — MELOXICAM 15 MG/1
15 TABLET ORAL
COMMUNITY
Start: 2025-04-30

## 2025-05-13 RX ORDER — PREDNISONE 20 MG/1
TABLET ORAL
Qty: 18 TABLET | Refills: 0 | Status: SHIPPED | OUTPATIENT
Start: 2025-05-13 | End: 2025-05-22

## 2025-05-13 ASSESSMENT — ENCOUNTER SYMPTOMS
SLEEP DISTURBANCE: 0
WHEEZING: 0
JOINT SWELLING: 0
CONFUSION: 0
ABDOMINAL PAIN: 1
ACTIVITY CHANGE: 0
DYSURIA: 0
SEIZURES: 0
DIZZINESS: 0
OCCASIONAL FEELINGS OF UNSTEADINESS: 0
APPETITE CHANGE: 0
VOMITING: 0
FATIGUE: 0
POLYDIPSIA: 0
WOUND: 0
DIARRHEA: 0
PALPITATIONS: 0
BRUISES/BLEEDS EASILY: 0
SORE THROAT: 0
HEADACHES: 0
DEPRESSION: 0
CONSTIPATION: 0
FLANK PAIN: 0
NAUSEA: 1
COUGH: 0
LOSS OF SENSATION IN FEET: 0
UNEXPECTED WEIGHT CHANGE: 0
TROUBLE SWALLOWING: 0
CHEST TIGHTNESS: 0
ARTHRALGIAS: 0
MYALGIAS: 0
BLOOD IN STOOL: 0
HEMATURIA: 0
EYE PAIN: 0
BACK PAIN: 0
NECK PAIN: 0
CHILLS: 0
SHORTNESS OF BREATH: 0
FEVER: 0
PHOTOPHOBIA: 0

## 2025-05-13 ASSESSMENT — PATIENT HEALTH QUESTIONNAIRE - PHQ9
1. LITTLE INTEREST OR PLEASURE IN DOING THINGS: NOT AT ALL
2. FEELING DOWN, DEPRESSED OR HOPELESS: NOT AT ALL
SUM OF ALL RESPONSES TO PHQ9 QUESTIONS 1 AND 2: 0

## 2025-05-13 NOTE — LETTER
May 13, 2025     Patient: Yolanda Kumar   YOB: 1975   Date of Visit: 5/13/2025       To Whom It May Concern:    Yolanda Kumar was seen in my clinic on 5/13/2025. Please excuse Yolanda for her absence from work. She may return 05/19/2025.If you have any questions or concerns, please don't hesitate to call.         Sincerely,         Ivonne Mars, RUDI-CNS

## 2025-05-13 NOTE — PROGRESS NOTES
Subjective   Patient ID: Yolanda Kumar is a 49 y.o. female who presents for Follow-up (Er follow up).  HPI    Patient presents in the office today as an ER follow up. Seen in the ER on 05/09/2025. Presented with Abdominal Pain, Flank Pain, Nausea. CT done in the ER. Labs, EKG and Imaging completed. Symptomatic treatment with Bentyl and Zofran. Diagnosed with Gastroenteritis. Pain still persists. Worse in the RLQ. Has to drive for 1H to work, aggravated with sitting up and had to turn around and drive home.     CT showed Adrenal increased in size, patient was not aware that she had.     Review of Systems   Constitutional:  Negative for activity change, appetite change, chills, fatigue, fever and unexpected weight change.   HENT:  Negative for ear pain, hearing loss, nosebleeds, sore throat, tinnitus and trouble swallowing.    Eyes:  Negative for photophobia, pain and visual disturbance.   Respiratory:  Negative for cough, chest tightness, shortness of breath and wheezing.    Cardiovascular:  Negative for chest pain, palpitations and leg swelling.   Gastrointestinal:  Positive for abdominal pain and nausea. Negative for blood in stool, constipation, diarrhea and vomiting.   Endocrine: Negative for cold intolerance, heat intolerance, polydipsia and polyuria.   Genitourinary:  Negative for dysuria, flank pain and hematuria.   Musculoskeletal:  Negative for arthralgias, back pain, joint swelling, myalgias and neck pain.   Skin:  Negative for pallor, rash and wound.   Allergic/Immunologic: Negative for immunocompromised state.   Neurological:  Negative for dizziness, seizures and headaches.   Hematological:  Does not bruise/bleed easily.   Psychiatric/Behavioral:  Negative for confusion and sleep disturbance.        Objective   Physical Exam  Vitals and nursing note reviewed.   Constitutional:       General: She is not in acute distress.     Appearance: Normal appearance.   HENT:      Head: Normocephalic.      Nose:  Nose normal.   Eyes:      Conjunctiva/sclera: Conjunctivae normal.   Neck:      Vascular: No carotid bruit.   Cardiovascular:      Rate and Rhythm: Normal rate and regular rhythm.      Pulses: Normal pulses.      Heart sounds: Normal heart sounds.   Pulmonary:      Effort: Pulmonary effort is normal.      Breath sounds: Normal breath sounds.   Abdominal:      General: Bowel sounds are normal.      Palpations: Abdomen is soft.      Tenderness: There is abdominal tenderness.   Musculoskeletal:         General: Normal range of motion.      Cervical back: Normal range of motion.   Skin:     General: Skin is warm and dry.   Neurological:      Mental Status: She is alert and oriented to person, place, and time. Mental status is at baseline.   Psychiatric:         Mood and Affect: Mood normal.         Behavior: Behavior normal.         Assessment/Plan        Abdominal Pain, Nausea: Reviewed ER report with patient and testing results. Will try Steroid to help with inflammation. Zofran PRN for Nausea and can continue Bentyl as prescribed in the ER. Off work excuse provided due to persistent symptoms. Consider holding Tirzepatide pending response.   Adrenal Abnormality: Dedicated Adrenals ordered. Will follow up pending results.     RUDI Ramirez-CNS 05/13/25 2:53 PM

## 2025-05-15 ENCOUNTER — PATIENT MESSAGE (OUTPATIENT)
Dept: PRIMARY CARE | Facility: CLINIC | Age: 50
End: 2025-05-15
Payer: COMMERCIAL

## 2025-05-15 DIAGNOSIS — R10.9 ABDOMINAL PAIN, UNSPECIFIED ABDOMINAL LOCATION: Primary | ICD-10-CM

## 2025-05-15 DIAGNOSIS — R93.5 ABNORMAL CT OF THE ABDOMEN: ICD-10-CM

## 2025-05-16 ENCOUNTER — APPOINTMENT (OUTPATIENT)
Dept: CARDIOLOGY | Facility: HOSPITAL | Age: 50
End: 2025-05-16
Payer: COMMERCIAL

## 2025-05-16 ENCOUNTER — HOSPITAL ENCOUNTER (EMERGENCY)
Facility: HOSPITAL | Age: 50
Discharge: HOME | End: 2025-05-16
Attending: STUDENT IN AN ORGANIZED HEALTH CARE EDUCATION/TRAINING PROGRAM
Payer: COMMERCIAL

## 2025-05-16 ENCOUNTER — APPOINTMENT (OUTPATIENT)
Dept: RADIOLOGY | Facility: HOSPITAL | Age: 50
End: 2025-05-16
Payer: COMMERCIAL

## 2025-05-16 VITALS
BODY MASS INDEX: 51.91 KG/M2 | TEMPERATURE: 98.6 F | OXYGEN SATURATION: 97 % | RESPIRATION RATE: 16 BRPM | HEART RATE: 80 BPM | DIASTOLIC BLOOD PRESSURE: 84 MMHG | HEIGHT: 63 IN | SYSTOLIC BLOOD PRESSURE: 113 MMHG | WEIGHT: 293 LBS

## 2025-05-16 DIAGNOSIS — R10.31 RIGHT LOWER QUADRANT ABDOMINAL PAIN: Primary | ICD-10-CM

## 2025-05-16 LAB
ALBUMIN SERPL BCP-MCNC: 3.4 G/DL (ref 3.4–5)
ALP SERPL-CCNC: 51 U/L (ref 33–110)
ALT SERPL W P-5'-P-CCNC: 17 U/L (ref 7–45)
ANION GAP SERPL CALC-SCNC: 10 MMOL/L (ref 10–20)
APPEARANCE UR: CLEAR
APTT PPP: 30 SECONDS (ref 26–36)
AST SERPL W P-5'-P-CCNC: 10 U/L (ref 9–39)
BASOPHILS # BLD AUTO: 0.12 X10*3/UL (ref 0–0.1)
BASOPHILS NFR BLD AUTO: 1.1 %
BILIRUB SERPL-MCNC: 0.3 MG/DL (ref 0–1.2)
BILIRUB UR STRIP.AUTO-MCNC: NEGATIVE MG/DL
BUN SERPL-MCNC: 17 MG/DL (ref 6–23)
CALCIUM SERPL-MCNC: 7.8 MG/DL (ref 8.6–10.3)
CARDIAC TROPONIN I PNL SERPL HS: <3 NG/L (ref 0–13)
CHLORIDE SERPL-SCNC: 103 MMOL/L (ref 98–107)
CO2 SERPL-SCNC: 22 MMOL/L (ref 21–32)
COLOR UR: ABNORMAL
CREAT SERPL-MCNC: 0.72 MG/DL (ref 0.5–1.05)
EGFRCR SERPLBLD CKD-EPI 2021: >90 ML/MIN/1.73M*2
EOSINOPHIL # BLD AUTO: 0.31 X10*3/UL (ref 0–0.7)
EOSINOPHIL NFR BLD AUTO: 2.9 %
ERYTHROCYTE [DISTWIDTH] IN BLOOD BY AUTOMATED COUNT: 13.6 % (ref 11.5–14.5)
GLUCOSE SERPL-MCNC: 80 MG/DL (ref 74–99)
GLUCOSE UR STRIP.AUTO-MCNC: NORMAL MG/DL
HCT VFR BLD AUTO: 41.6 % (ref 36–46)
HGB BLD-MCNC: 14 G/DL (ref 12–16)
IMM GRANULOCYTES # BLD AUTO: 0.06 X10*3/UL (ref 0–0.7)
IMM GRANULOCYTES NFR BLD AUTO: 0.6 % (ref 0–0.9)
INR PPP: 1 (ref 0.9–1.1)
KETONES UR STRIP.AUTO-MCNC: NEGATIVE MG/DL
LACTATE SERPL-SCNC: 1.1 MMOL/L (ref 0.4–2)
LEUKOCYTE ESTERASE UR QL STRIP.AUTO: NEGATIVE
LIPASE SERPL-CCNC: 11 U/L (ref 9–82)
LYMPHOCYTES # BLD AUTO: 2.75 X10*3/UL (ref 1.2–4.8)
LYMPHOCYTES NFR BLD AUTO: 26.1 %
MAGNESIUM SERPL-MCNC: 1.69 MG/DL (ref 1.6–2.4)
MCH RBC QN AUTO: 30.5 PG (ref 26–34)
MCHC RBC AUTO-ENTMCNC: 33.7 G/DL (ref 32–36)
MCV RBC AUTO: 91 FL (ref 80–100)
MONOCYTES # BLD AUTO: 0.78 X10*3/UL (ref 0.1–1)
MONOCYTES NFR BLD AUTO: 7.4 %
NEUTROPHILS # BLD AUTO: 6.53 X10*3/UL (ref 1.2–7.7)
NEUTROPHILS NFR BLD AUTO: 61.9 %
NITRITE UR QL STRIP.AUTO: NEGATIVE
NRBC BLD-RTO: 0 /100 WBCS (ref 0–0)
PH UR STRIP.AUTO: 5.5 [PH]
PHOSPHATE SERPL-MCNC: 3.2 MG/DL (ref 2.5–4.9)
PLATELET # BLD AUTO: 270 X10*3/UL (ref 150–450)
POTASSIUM SERPL-SCNC: 3.6 MMOL/L (ref 3.5–5.3)
PROT SERPL-MCNC: 5.3 G/DL (ref 6.4–8.2)
PROT UR STRIP.AUTO-MCNC: NEGATIVE MG/DL
PROTHROMBIN TIME: 10.8 SECONDS (ref 9.8–12.4)
RBC # BLD AUTO: 4.59 X10*6/UL (ref 4–5.2)
RBC # UR STRIP.AUTO: NEGATIVE MG/DL
SODIUM SERPL-SCNC: 131 MMOL/L (ref 136–145)
SP GR UR STRIP.AUTO: >1.05
UROBILINOGEN UR STRIP.AUTO-MCNC: NORMAL MG/DL
WBC # BLD AUTO: 10.6 X10*3/UL (ref 4.4–11.3)

## 2025-05-16 PROCEDURE — 2500000005 HC RX 250 GENERAL PHARMACY W/O HCPCS: Performed by: STUDENT IN AN ORGANIZED HEALTH CARE EDUCATION/TRAINING PROGRAM

## 2025-05-16 PROCEDURE — 74177 CT ABD & PELVIS W/CONTRAST: CPT

## 2025-05-16 PROCEDURE — 2500000004 HC RX 250 GENERAL PHARMACY W/ HCPCS (ALT 636 FOR OP/ED): Mod: JZ | Performed by: STUDENT IN AN ORGANIZED HEALTH CARE EDUCATION/TRAINING PROGRAM

## 2025-05-16 PROCEDURE — 36415 COLL VENOUS BLD VENIPUNCTURE: CPT | Performed by: STUDENT IN AN ORGANIZED HEALTH CARE EDUCATION/TRAINING PROGRAM

## 2025-05-16 PROCEDURE — 71275 CT ANGIOGRAPHY CHEST: CPT | Performed by: RADIOLOGY

## 2025-05-16 PROCEDURE — 99285 EMERGENCY DEPT VISIT HI MDM: CPT | Mod: 25 | Performed by: STUDENT IN AN ORGANIZED HEALTH CARE EDUCATION/TRAINING PROGRAM

## 2025-05-16 PROCEDURE — 81003 URINALYSIS AUTO W/O SCOPE: CPT | Performed by: STUDENT IN AN ORGANIZED HEALTH CARE EDUCATION/TRAINING PROGRAM

## 2025-05-16 PROCEDURE — 84100 ASSAY OF PHOSPHORUS: CPT | Performed by: STUDENT IN AN ORGANIZED HEALTH CARE EDUCATION/TRAINING PROGRAM

## 2025-05-16 PROCEDURE — 83735 ASSAY OF MAGNESIUM: CPT | Performed by: STUDENT IN AN ORGANIZED HEALTH CARE EDUCATION/TRAINING PROGRAM

## 2025-05-16 PROCEDURE — 96374 THER/PROPH/DIAG INJ IV PUSH: CPT | Mod: 59

## 2025-05-16 PROCEDURE — 96361 HYDRATE IV INFUSION ADD-ON: CPT

## 2025-05-16 PROCEDURE — 71275 CT ANGIOGRAPHY CHEST: CPT

## 2025-05-16 PROCEDURE — 85610 PROTHROMBIN TIME: CPT | Performed by: STUDENT IN AN ORGANIZED HEALTH CARE EDUCATION/TRAINING PROGRAM

## 2025-05-16 PROCEDURE — 74177 CT ABD & PELVIS W/CONTRAST: CPT | Performed by: RADIOLOGY

## 2025-05-16 PROCEDURE — 84484 ASSAY OF TROPONIN QUANT: CPT | Performed by: STUDENT IN AN ORGANIZED HEALTH CARE EDUCATION/TRAINING PROGRAM

## 2025-05-16 PROCEDURE — 80053 COMPREHEN METABOLIC PANEL: CPT | Performed by: STUDENT IN AN ORGANIZED HEALTH CARE EDUCATION/TRAINING PROGRAM

## 2025-05-16 PROCEDURE — 2500000004 HC RX 250 GENERAL PHARMACY W/ HCPCS (ALT 636 FOR OP/ED)

## 2025-05-16 PROCEDURE — 83605 ASSAY OF LACTIC ACID: CPT | Performed by: STUDENT IN AN ORGANIZED HEALTH CARE EDUCATION/TRAINING PROGRAM

## 2025-05-16 PROCEDURE — 93005 ELECTROCARDIOGRAM TRACING: CPT

## 2025-05-16 PROCEDURE — 85025 COMPLETE CBC W/AUTO DIFF WBC: CPT | Performed by: STUDENT IN AN ORGANIZED HEALTH CARE EDUCATION/TRAINING PROGRAM

## 2025-05-16 PROCEDURE — 2550000001 HC RX 255 CONTRASTS: Performed by: STUDENT IN AN ORGANIZED HEALTH CARE EDUCATION/TRAINING PROGRAM

## 2025-05-16 PROCEDURE — 83690 ASSAY OF LIPASE: CPT | Performed by: STUDENT IN AN ORGANIZED HEALTH CARE EDUCATION/TRAINING PROGRAM

## 2025-05-16 PROCEDURE — 96375 TX/PRO/DX INJ NEW DRUG ADDON: CPT | Mod: 59

## 2025-05-16 PROCEDURE — 96376 TX/PRO/DX INJ SAME DRUG ADON: CPT

## 2025-05-16 RX ORDER — ORPHENADRINE CITRATE 30 MG/ML
60 INJECTION INTRAMUSCULAR; INTRAVENOUS ONCE
Status: COMPLETED | OUTPATIENT
Start: 2025-05-16 | End: 2025-05-16

## 2025-05-16 RX ORDER — KETOROLAC TROMETHAMINE 15 MG/ML
15 INJECTION, SOLUTION INTRAMUSCULAR; INTRAVENOUS ONCE
Status: COMPLETED | OUTPATIENT
Start: 2025-05-16 | End: 2025-05-16

## 2025-05-16 RX ORDER — HYDROMORPHONE HYDROCHLORIDE 1 MG/ML
1 INJECTION, SOLUTION INTRAMUSCULAR; INTRAVENOUS; SUBCUTANEOUS ONCE
Status: COMPLETED | OUTPATIENT
Start: 2025-05-16 | End: 2025-05-16

## 2025-05-16 RX ORDER — HYDROCODONE BITARTRATE AND ACETAMINOPHEN 5; 325 MG/1; MG/1
1 TABLET ORAL EVERY 6 HOURS PRN
Qty: 12 TABLET | Refills: 0 | Status: SHIPPED | OUTPATIENT
Start: 2025-05-16 | End: 2025-05-20

## 2025-05-16 RX ORDER — ONDANSETRON 4 MG/1
4 TABLET, FILM COATED ORAL EVERY 6 HOURS
Qty: 12 TABLET | Refills: 0 | Status: SHIPPED | OUTPATIENT
Start: 2025-05-16 | End: 2025-05-19

## 2025-05-16 RX ORDER — ONDANSETRON HYDROCHLORIDE 2 MG/ML
4 INJECTION, SOLUTION INTRAVENOUS ONCE
Status: COMPLETED | OUTPATIENT
Start: 2025-05-16 | End: 2025-05-16

## 2025-05-16 RX ORDER — LIDOCAINE 560 MG/1
1 PATCH PERCUTANEOUS; TOPICAL; TRANSDERMAL DAILY
Status: DISCONTINUED | OUTPATIENT
Start: 2025-05-16 | End: 2025-05-16 | Stop reason: HOSPADM

## 2025-05-16 RX ADMIN — SODIUM CHLORIDE, SODIUM LACTATE, POTASSIUM CHLORIDE, AND CALCIUM CHLORIDE 1000 ML: .6; .31; .03; .02 INJECTION, SOLUTION INTRAVENOUS at 15:56

## 2025-05-16 RX ADMIN — IOHEXOL 75 ML: 350 INJECTION, SOLUTION INTRAVENOUS at 16:19

## 2025-05-16 RX ADMIN — LIDOCAINE 4% 1 PATCH: 40 PATCH TOPICAL at 17:25

## 2025-05-16 RX ADMIN — ORPHENADRINE CITRATE 60 MG: 60 INJECTION INTRAMUSCULAR; INTRAVENOUS at 17:25

## 2025-05-16 RX ADMIN — HYDROMORPHONE HYDROCHLORIDE 1 MG: 1 INJECTION, SOLUTION INTRAMUSCULAR; INTRAVENOUS; SUBCUTANEOUS at 15:56

## 2025-05-16 RX ADMIN — ONDANSETRON 4 MG: 2 INJECTION, SOLUTION INTRAMUSCULAR; INTRAVENOUS at 18:30

## 2025-05-16 RX ADMIN — ONDANSETRON 4 MG: 2 INJECTION, SOLUTION INTRAMUSCULAR; INTRAVENOUS at 15:56

## 2025-05-16 RX ADMIN — KETOROLAC TROMETHAMINE 15 MG: 15 INJECTION, SOLUTION INTRAMUSCULAR; INTRAVENOUS at 17:25

## 2025-05-16 RX ADMIN — HYDROMORPHONE HYDROCHLORIDE 1 MG: 1 INJECTION, SOLUTION INTRAMUSCULAR; INTRAVENOUS; SUBCUTANEOUS at 18:53

## 2025-05-16 ASSESSMENT — PAIN DESCRIPTION - LOCATION: LOCATION: ABDOMEN

## 2025-05-16 ASSESSMENT — LIFESTYLE VARIABLES
HAVE PEOPLE ANNOYED YOU BY CRITICIZING YOUR DRINKING: NO
TOTAL SCORE: 0
EVER FELT BAD OR GUILTY ABOUT YOUR DRINKING: NO
EVER HAD A DRINK FIRST THING IN THE MORNING TO STEADY YOUR NERVES TO GET RID OF A HANGOVER: NO
HAVE YOU EVER FELT YOU SHOULD CUT DOWN ON YOUR DRINKING: NO

## 2025-05-16 ASSESSMENT — PAIN SCALES - GENERAL
PAINLEVEL_OUTOF10: 5 - MODERATE PAIN
PAINLEVEL_OUTOF10: 7
PAINLEVEL_OUTOF10: 9
PAINLEVEL_OUTOF10: 9
PAINLEVEL_OUTOF10: 0 - NO PAIN
PAINLEVEL_OUTOF10: 7

## 2025-05-16 ASSESSMENT — PAIN - FUNCTIONAL ASSESSMENT
PAIN_FUNCTIONAL_ASSESSMENT: 0-10

## 2025-05-16 ASSESSMENT — PAIN DESCRIPTION - ORIENTATION: ORIENTATION: RIGHT

## 2025-05-16 ASSESSMENT — PAIN DESCRIPTION - DESCRIPTORS: DESCRIPTORS: SHARP

## 2025-05-16 NOTE — ED PROVIDER NOTES
CC: Abdominal Pain (Pt c/o Rt flank/abd for over a week with nausea.)     HPI:  Patient is a 49-year-old female with a history of tobacco use disorder and fibromyalgia who presents to the emergency department abdominal pain.  It has been going on since May 7.  She describes as right lower quadrant abdominal pain.  She states it started out in her right flank area and she thought it may be a urinary tract infection but then it started moving to the right lower quadrant. She states every time she gets up and moves she feels like she is going to pass out.  She was written for Bentyl and states it did not help.  She saw her primary care doctor who wrote her for steroids and states despite the steroids it still getting worse.  She denies fever.  States she feels like a pressure like she has to urinate but no dysuria or hematuria.  She states she has not slept or ate in 2 days.  She did have 1 episode of nonbilious nonbloody emesis yesterday.  She states she just feels miserable and that is why she is back in the emergency department today.    Records Reviewed:  Recent available ED and inpatient notes reviewed in EMR.    PMHx/PSHx:  Per HPI.   - has a past medical history of Acquired clubfoot, right foot, Anxiety, Asthma, Dental disease, Depression, Easy bruising, Fibromyalgia, primary, Interstitial cystitis (chronic) without hematuria, Joint pain, Personal history of other diseases of the nervous system and sense organs, Personal history of other diseases of the respiratory system, and Vision loss.  - has a past surgical history that includes Other surgical history (2020); Other surgical history (2020); Other surgical history (2020); Other surgical history (2020); Other surgical history (2020); Other surgical history (02/10/2022);  section, classic; Knee arthroscopy w/ meniscectomy (Right); and Colonoscopy.  - has Acute pain of left knee; Anxiety; Back pain, chronic; Chronic  headaches; Fibromyalgia; Mirela-Danlos syndrome (HHS-HCC); Hyperlipidemia; Insulin resistance; Low vitamin D level; Migraine headache; Mild intermittent asthma; Morbid obesity (Multi); Candidiasis of vagina; Knee pain; Ankle pain; Hyperglycemia; Chronic interstitial cystitis; and Acquired equinovarus deformity of both feet on their problem list.    Medications:  Reviewed in EMR. See EMR for complete list of medications and doses.    Allergies:  Influenza virus vaccines    Social History:  - Tobacco:  reports that she has been smoking cigarettes. She has a 12.5 pack-year smoking history. She has never used smokeless tobacco.   - Alcohol:  reports no history of alcohol use.   - Illicit Drugs:  reports no history of drug use.     ROS:  Per HPI.       ???????????????????????????????????????????????????????????????  Triage Vitals:  T 37 °C (98.6 °F)  HR (!) 109  BP (!) 140/92  RR 18  O2 99 % None (Room air)    Physical Exam  ???????????????????????????????????????????????????????????????  GEN: in mild distress  HEAD: atraumatic  EYES: no scleral icterus  ENT: mmm  NECK: no JVD  CVS/CHEST: Tachycardia  PULM: CTA b/l no wheezes, crackles, or rhonchi   GI: soft, positive McBurney's.  Tender in right lower quadrant.  Negative Rovsing's.  BACK: + R CVA tenderness, no vertebral point tenderness.  No rash.  EXT: no pitting LE edema, 2+ periph pulses in bilat radial and DP   NEURO: Awake and alert, Strength and sensation is equal in b/l upper and lower extremities  SKIN: warm, dry  PSYCH: AAOx3 answers questions appropriately    Assessment and Plan:  Patient is a 49-year-old female presents to the emergency department with a right back pain that radiates into her right lower abdomen.  She was seen here about a week ago and symptoms are getting worse.  She is now tachycardic.  She states she feels like she is going to pass out when she gets up and ambulates.  She does smoke.  She denies chest pain or shortness of breath  however given the near syncope and tachycardia we will obtain a CT angio of the chest as well as CT abdomen pelvis as her prior CT may have missed an appendicitis or pyelonephritis.  She did have a right adrenal adenoma on last CT which I think is unlikely to be causing her symptoms today.  This was discussed with patient.  Treated with a milligram of Dilaudid, 4 mg of Zofran and a liter of IV fluids.  Disposition pending workup.    Workup in the emergency department is relatively reassuring.  She also had a right oophorectomy therefore ovarian pathology is not the etiology of her right lower quadrant abdominal pain.  May be coming from her back as it wraps around and goes down to her groin.  Discussed potential pinched nerve.  She has a GI appointment scheduled for Monday.  She was referred to PMNR and pain management as well.  She states the medications that she has been taking are not helping.  She has chronic muscle relaxers and anti-inflammatories which she takes for her fibromyalgia and therefore was written for a very short course of Norco to help symptomatic management until she follows with pain management or PMNR.    Discussed the potential need for outpatient MRI however she has no signs or symptoms of cauda equina today.  Her gluteal tone is intact.  She denies saddle anesthesia.  She has no urinary retention or incontinence.  She is ambulating and equal 5 out of 5 strength in bilateral lower extremities.  2+ DP pulses bilaterally.    ED Course:  ED Course as of 05/16/25 1931   Fri May 16, 2025   1629  EKG read by me reviewed by me is normal sinus rhythm at 84 bpm.  Normal axis.  Normal intervals.  No ST segment elevation or depression. [HD]   1701 IMPRESSION:  1.  No acute intrathoracic abnormality.       [HD]   1702 IMPRESSION:  1.  No acute intra-abdominal abnormality.  2. Findings appear unchanged since comparison CT imaging performed  recently on 05/09/2025.       [HD]   1842 No acute osseous  abnormality. Multilevel spondylosis throughout the  imaged spine.       [HD]      ED Course User Index  [HD] Luzma Silver DO         Diagnoses as of 05/16/25 1931   Right lower quadrant abdominal pain       Disposition:  Discharged in stable condition with return precautions    Luzma Silver DO      Procedures ? [employer]Links last updated 5/16/2025 3:48 PM        Luzma Silver DO  05/16/25 1933

## 2025-05-16 NOTE — Clinical Note
Yolanda Kumar was seen and treated in our emergency department on 5/16/2025.  She may return to work on 05/19/2025.       If you have any questions or concerns, please don't hesitate to call.      Luzma Silver, DO

## 2025-05-16 NOTE — Clinical Note
Yolanda Kumar was seen and treated in our emergency department on 5/16/2025.  She may return to work on 05/21/2025.       If you have any questions or concerns, please don't hesitate to call.      Luzma Silver, DO

## 2025-05-17 LAB — HOLD SPECIMEN: NORMAL

## 2025-05-20 ENCOUNTER — APPOINTMENT (OUTPATIENT)
Facility: CLINIC | Age: 50
End: 2025-05-20
Payer: COMMERCIAL

## 2025-05-20 ENCOUNTER — PATIENT MESSAGE (OUTPATIENT)
Dept: PRIMARY CARE | Facility: CLINIC | Age: 50
End: 2025-05-20

## 2025-05-20 VITALS
DIASTOLIC BLOOD PRESSURE: 78 MMHG | OXYGEN SATURATION: 97 % | WEIGHT: 293 LBS | BODY MASS INDEX: 51.91 KG/M2 | HEART RATE: 91 BPM | SYSTOLIC BLOOD PRESSURE: 113 MMHG | HEIGHT: 63 IN

## 2025-05-20 DIAGNOSIS — R10.9 ABDOMINAL PAIN, UNSPECIFIED ABDOMINAL LOCATION: ICD-10-CM

## 2025-05-20 PROCEDURE — 3008F BODY MASS INDEX DOCD: CPT | Performed by: INTERNAL MEDICINE

## 2025-05-20 PROCEDURE — 99213 OFFICE O/P EST LOW 20 MIN: CPT | Performed by: INTERNAL MEDICINE

## 2025-05-20 ASSESSMENT — ENCOUNTER SYMPTOMS
ROS GI COMMENTS: AS DETAILED ABOVE.
SEIZURES: 0
NUMBNESS: 0
HEMATURIA: 0
BRUISES/BLEEDS EASILY: 0
WHEEZING: 0
SORE THROAT: 0
CHILLS: 0
UNEXPECTED WEIGHT CHANGE: 1
DYSURIA: 0
COUGH: 0
DIZZINESS: 0
MYALGIAS: 0
FATIGUE: 0
TROUBLE SWALLOWING: 0
VOICE CHANGE: 0
FEVER: 0
ADENOPATHY: 0
NERVOUS/ANXIOUS: 0
CONFUSION: 0
HEADACHES: 0
WEAKNESS: 0
SHORTNESS OF BREATH: 0
ARTHRALGIAS: 1
PALPITATIONS: 0
BACK PAIN: 1

## 2025-05-20 NOTE — PROGRESS NOTES
[facility]    ASSESSMENT and PLAN:       Yolanda Kumar is a 49 y.o. female with a significant past medical history of HLD, obesity (BMI 56), fibromyalgia, migraines, Mirela-Danlos Syndrome, and asthma who presents for consultation requested by her primary care provider (RUDI Ramirez-CNS) to arrange for a colonoscopy.       Back/abdominal pain  Subacute onset pain that started as back pain and now has radiated around the abdomen. Description of pain and the features that she describes are most consistent with a MSK or neuropathic source. Symptoms not typical of a GI process. Carnett's sign is positive which would also be consistent with an abdominal wall/MSK/or neuropathic process. CT scan suggested colonic fat deposition which can be seen in chronic inflammatory conditions, but is ultimately nonspecific and there are no other findings consistent with an underlying inflammatory bowel disease. No other significant GI findings on CT. Additionally she had a colonoscopy in September 2024 that showed no evidence of any inflammatory process. Ultimately these symptoms are not likely related to any GI process and no additional GI work up indicated at this time.      Colon polyp  Previous colonoscopy with adenomatous polyp.  - due for screening/surveillance colonoscopy in 2029          Follow up as needed.          Saleem Xiao MD        Senior Attending Physician, Gastroenterology    [facility]    Clinical   [employer]        Subjective   HISTORY OF PRESENT ILLNESS:     Chief Complaint  Abdominal Pain    History Of Present Illness:    Yolanda Kumar is a 49 y.o. female with a significant past medical history of HLD, obesity (BMI 56), fibromyalgia, migraines, Mirela-Danlos Syndrome, and asthma who presents for follow up and complains of abdominal pain.    She follows with  Ivonne Mars, RUDI-CNS.     She was seen by her PCP in May 2024 at which time a screening colonoscopy was ordered. Labs include a normal CBC.    She says that she did have a colonoscopy about 9-10 years ago that was normal. She says that colonoscopy was done to work up chronic diarrhea and she says that biopsies at that time that were also normal. Her mother and father have had polyps in the past, but no family history of colon cancer. When I initially saw her in August 2024 she also reported chronic diarrhea. I ordered labs that showed a normal CRP and normal TTG IgA. Colonoscopy was done (detailed below) and I recommended a follow up colonoscopy in 5 years.    She was seen in the ER on 5/9/2025 for abdominal pain that was thought to be secondary to gastroenteritis. She was discharged with Bentyl and Zofran. She was subsequently seen by her PCP who prescribed prednisone “to help with inflammation”. Labs have shown a normal CBC, unremarkable CMP, normal lipase, and normal lactate. CT on 5/9 showed diverticulosis and fat deposition in the colon, but no other GI findings. Another CT on 5/16 showed no GI abnormality.      Today she says that she has been having issues with pain for the last 3 weeks. This started with pain in the right side of her back. This has progressed to be pain that wraps around the right side of her abdomen in a band-like distribution. Pain is constant, but is worse with movement including bending. She has not noticed any relationship to eating. She has been eating less because of decreased appetite and has had some nausea with medications that she was given for pain. BMs have been normal and she denies any diarrhea or constipation. Pain does not change with BMs.      Patient denies any heartburn/GERD, N/V, dysphagia, odynophagia, constipation, hematemesis, melena, or weight loss.      Endoscopic History  9/20/2024 - Colonoscopy: transverse polyp (TA on path)    Review of systems:   Review of  Systems   Constitutional:  Positive for unexpected weight change (weight gain). Negative for chills, fatigue and fever.   HENT:  Negative for congestion, sneezing, sore throat, trouble swallowing and voice change.    Respiratory:  Negative for cough, shortness of breath and wheezing.    Cardiovascular:  Negative for chest pain, palpitations and leg swelling.   Gastrointestinal:         As detailed above.   Genitourinary:  Negative for dysuria and hematuria.   Musculoskeletal:  Positive for arthralgias, back pain and gait problem. Negative for myalgias.   Skin:  Negative for pallor and rash.   Neurological:  Negative for dizziness, seizures, syncope, weakness, numbness and headaches.   Hematological:  Negative for adenopathy. Does not bruise/bleed easily.   Psychiatric/Behavioral:  Negative for confusion. The patient is not nervous/anxious.    All other systems reviewed and are negative.      I performed a complete 10 point review of systems and it is negative except as noted in HPI or above.      PAST HISTORIES:       Past Medical History:  Patient Active Problem List   Diagnosis    Acute pain of left knee    Anxiety    Back pain, chronic    Chronic headaches    Fibromyalgia    Mirela-Danlos syndrome ([facility]-HCC)    Hyperlipidemia    Insulin resistance    Low vitamin D level    Migraine headache    Mild intermittent asthma    Morbid obesity (Multi)    Candidiasis of vagina    Knee pain    Ankle pain    Hyperglycemia    Chronic interstitial cystitis    Acquired equinovarus deformity of both feet     She has a past medical history of Acquired clubfoot, right foot, Anxiety, Asthma, Dental disease, Depression, Easy bruising, Fibromyalgia, primary, Interstitial cystitis (chronic) without hematuria, Joint pain, Personal history of other diseases of the nervous system and sense organs, Personal history of other diseases of the respiratory system, and Vision loss.    Past Surgical History:  She has a past surgical history that  "includes Other surgical history (2020); Other surgical history (2020); Other surgical history (2020); Other surgical history (2020); Other surgical history (2020); Other surgical history (02/10/2022);  section, classic; Knee arthroscopy w/ meniscectomy (Right); and Colonoscopy.      Social History:  She reports that she has been smoking cigarettes. She has a 12.5 pack-year smoking history. She has never used smokeless tobacco. She reports that she does not drink alcohol and does not use drugs.    Family History:  No known family history of GI disease, specifically denies any family history of pancreatitis, Crohn's, colon cancer, gastroesophageal cancer, or ulcerative colitis.    Family History   Problem Relation Name Age of Onset    Diabetes Mother      Mirela-Danlos syndrome Mother      Prostate cancer Father      Other (htn) Father      Diabetes Father          Allergies:  Influenza virus vaccines      Objective   OBJECTIVE:       Last Recorded Vitals:  Vitals:    25 1346   BP: 113/78   Pulse: 91   SpO2: 97%   Weight: 144 kg (317 lb)   Height: 1.6 m (5' 3\")       /78   Pulse 91   Ht 1.6 m (5' 3\")   Wt 144 kg (317 lb)   SpO2 97%   BMI 56.15 kg/m²      Physical Exam:    Physical Exam  Vitals reviewed.   Constitutional:       General: She is not in acute distress.     Appearance: She is obese. She is not ill-appearing.   HENT:      Head: Normocephalic and atraumatic.   Eyes:      General: No scleral icterus.  Cardiovascular:      Rate and Rhythm: Normal rate and regular rhythm.      Pulses: Normal pulses.      Heart sounds: Normal heart sounds. No murmur heard.  Pulmonary:      Effort: Pulmonary effort is normal. No respiratory distress.      Breath sounds: Normal breath sounds. No wheezing.   Abdominal:      General: Bowel sounds are normal.      Palpations: Abdomen is soft.      Tenderness: There is abdominal tenderness. There is no rebound.      Comments: " +Carnett's sign   Musculoskeletal:         General: No swelling or deformity.   Skin:     General: Skin is warm and dry.      Coloration: Skin is not jaundiced.      Findings: No rash.   Neurological:      General: No focal deficit present.      Mental Status: She is alert and oriented to person, place, and time.   Psychiatric:         Mood and Affect: Mood normal.         Behavior: Behavior normal.         Thought Content: Thought content normal.         Judgment: Judgment normal.         Home Medications:  Prior to Admission medications    Medication Sig Start Date End Date Taking? Authorizing Provider   albuterol 90 mcg/actuation inhaler Inhale 2 puffs every 4 hours if needed. 9/25/20   Historical Provider, MD   amitriptyline (Elavil) 10 mg tablet Take 1 tablet (10 mg) by mouth once daily at bedtime.    Historical Provider, MD   buPROPion (Wellbutrin) 75 mg tablet Take 2 tablets (150 mg) by mouth once daily. 2/21/24 2/20/25  RUDI Ramirez-CNS   butalbital-acetaminophen-caff (Fioricet) -40 mg capsule Take 1 capsule by mouth once daily as needed for headaches. 7/5/23   RUID Deleon-CNP   cholecalciferol (Vitamin D-3) 125 MCG (5000 UT) capsule Take 1 capsule (125 mcg) by mouth once daily.    Historical Provider, MD   cyanocobalamin (Vitamin B-12) 1,000 mcg/mL injection Inject 1 mL (1,000 mcg) into the muscle every 30 (thirty) days. 5/22/24   RUDI Ramirez-CNS   cyanocobalamin, vitamin B-12, (Vitamin B-12) 2,500 mcg tablet, sublingual SL tablet Place 1 tablet (2,500 mcg) under the tongue once daily. 5/22/24 5/22/25  RUDI Ramirez-CNS   cyclobenzaprine (Flexeril) 5 mg tablet Take 1 tablet (5 mg) by mouth 2 times a day.    Historical Provider, MD   ergocalciferol (Vitamin D-2) 1.25 MG (69289 UT) capsule Take 1 capsule (50,000 Units) by mouth 2 times a week. 5/23/24 5/23/25  RUDI Ramirez-CNS   gabapentin (Neurontin) 400 mg capsule Take 1 capsule (400 mg) by mouth 4 times a  "day.    Historical Provider, MD   naltrexone (Depade) 50 mg tablet Take 0.5 tablets (25 mg) by mouth once daily. 2/21/24 2/20/25  REHANA Ramirez   rimegepant (Nurtec ODT) 75 mg tablet,disintegrating Take 1 tablet (75 mg) by mouth every other day. 2/21/24   REHANA Rmairez   syringe with needle 3 mL 21 gauge x 1\" syringe 1 each every 30 (thirty) days. Use for b12 injection 5/24/24   REHANA Ramirez   syringe with needle, safety 3 mL 20 gauge x 1 1/2\" syringe 1 each every 30 (thirty) days. Use for b12 injection 5/27/24   REHANA Ramirez   torsemide (Demadex) 10 mg tablet Take 1 tablet (10 mg) by mouth once daily. 2/21/24 2/20/25  REHANA Ramirez         Relevant Results Recent labs reviewed in the EMR.    Lab Results   Component Value Date/Time    WBC 10.6 05/16/2025 1601    WBC 8.0 05/08/2025 0822    HGB 14.0 05/16/2025 1601    HGB 15.0 05/09/2025 1623    HGB 14.8 05/08/2025 0822    HGB 15.1 02/01/2025 1127    MCV 91 05/16/2025 1601    MCV 91.2 05/08/2025 0822     05/16/2025 1601     05/09/2025 1623     05/08/2025 0822     02/01/2025 1127    [id number] 562 05/08/2025 0822       Lab Results   Component Value Date/Time     (L) 05/16/2025 1601     05/08/2025 0822    K 3.6 05/16/2025 1601    K 5.2 05/08/2025 0822     05/16/2025 1601     05/08/2025 0822    BUN 17 05/16/2025 1601    BUN 16 05/08/2025 0822    CREATININE 0.72 05/16/2025 1601    CREATININE 0.85 05/09/2025 1623    CREATININE 0.68 05/08/2025 0822       Lab Results   Component Value Date/Time    BILITOT 0.3 05/16/2025 1601    BILITOT 0.4 05/09/2025 1623    BILITOT 0.3 05/08/2025 0822    BILITOT 0.3 02/01/2025 1127    ALKPHOS 51 05/16/2025 1601    ALKPHOS 58 05/09/2025 1623    ALKPHOS 77 05/08/2025 0822    ALKPHOS 60 02/01/2025 1127    AST 10 05/16/2025 1601    AST 16 05/09/2025 1623    AST 20 05/08/2025 0822    AST 11 02/01/2025 1127    ALT 17 05/16/2025 1601    ALT " 21 05/09/2025 1623    ALT 22 05/08/2025 0822    ALT 13 02/01/2025 1127    LIPASE 11 05/16/2025 1601       Lab Results   Component Value Date/Time    CRP 0.17 01/29/2025 2000    CRP 0.32 08/27/2024 1615    CRP 0.97 08/24/2023 0919       Radiology: Imaging reviewed in the EMR.

## 2025-05-20 NOTE — PATIENT INSTRUCTIONS
Your current symptoms are less likely to be related to anything related to the GI tract (the intestines).    You will be due for another colonoscopy in 2029.      Follow up with GI as needed.

## 2025-05-20 NOTE — LETTER
May 20, 2025     REHANA Ramirez  [address]  [facility] [id number]    Patient: Yolanda Kumar   YOB: 1975   Date of Visit: 5/20/2025       Dear REHANA Murphy:    Thank you for referring Yolanda Kumar to me for evaluation. Below are my notes for this consultation.  If you have questions, please do not hesitate to call me. I look forward to following your patient along with you.       Sincerely,     Saleem Xiao MD      CC: No Recipients  ______________________________________________________________________________________        [facility] Gastroenterology    ASSESSMENT and PLAN:       Yolanda Kumar is a 49 y.o. female with a significant past medical history of HLD, obesity (BMI 56), fibromyalgia, migraines, Mirela-Danlos Syndrome, and asthma who presents for consultation requested by her primary care provider (REHANA Ramirez) to arrange for a colonoscopy.       Back/abdominal pain  Subacute onset pain that started as back pain and now has radiated around the abdomen. Description of pain and the features that she describes are most consistent with a MSK or neuropathic source. Symptoms not typical of a GI process. Carnett's sign is positive which would also be consistent with an abdominal wall/MSK/or neuropathic process. CT scan suggested colonic fat deposition which can be seen in chronic inflammatory conditions, but is ultimately nonspecific and there are no other findings consistent with an underlying inflammatory bowel disease. No other significant GI findings on CT. Additionally she had a colonoscopy in September 2024 that showed no evidence of any inflammatory process. Ultimately these symptoms are not likely related to any GI process and no additional GI work up indicated at this time.      Colon polyp  Previous colonoscopy with adenomatous polyp.  - due for screening/surveillance colonoscopy in  2029          Follow up as needed.          Saleem Xiao MD        Senior Attending Physician, Gastroenterology    [facility]    Clinical   [facility] [employer]        Subjective  HISTORY OF PRESENT ILLNESS:     Chief Complaint  Abdominal Pain    History Of Present Illness:    Yolanda Kumar is a 49 y.o. female with a significant past medical history of HLD, obesity (BMI 56), fibromyalgia, migraines, Mirela-Danlos Syndrome, and asthma who presents for follow up and complains of abdominal pain.    She follows with REHANA Ramirez.     She was seen by her PCP in May 2024 at which time a screening colonoscopy was ordered. Labs include a normal CBC.    She says that she did have a colonoscopy about 9-10 years ago that was normal. She says that colonoscopy was done to work up chronic diarrhea and she says that biopsies at that time that were also normal. Her mother and father have had polyps in the past, but no family history of colon cancer. When I initially saw her in August 2024 she also reported chronic diarrhea. I ordered labs that showed a normal CRP and normal TTG IgA. Colonoscopy was done (detailed below) and I recommended a follow up colonoscopy in 5 years.    She was seen in the ER on 5/9/2025 for abdominal pain that was thought to be secondary to gastroenteritis. She was discharged with Bentyl and Zofran. She was subsequently seen by her PCP who prescribed prednisone “to help with inflammation”. Labs have shown a normal CBC, unremarkable CMP, normal lipase, and normal lactate. CT on 5/9 showed diverticulosis and fat deposition in the colon, but no other GI findings. Another CT on 5/16 showed no GI abnormality.      Today she says that she has been having issues with pain for the last 3 weeks. This started with pain in the right side of her back. This has progressed to be pain that wraps around  the right side of her abdomen in a band-like distribution. Pain is constant, but is worse with movement including bending. She has not noticed any relationship to eating. She has been eating less because of decreased appetite and has had some nausea with medications that she was given for pain. BMs have been normal and she denies any diarrhea or constipation. Pain does not change with BMs.      Patient denies any heartburn/GERD, N/V, dysphagia, odynophagia, constipation, hematemesis, melena, or weight loss.      Endoscopic History  9/20/2024 - Colonoscopy: transverse polyp (TA on path)    Review of systems:   Review of Systems   Constitutional:  Positive for unexpected weight change (weight gain). Negative for chills, fatigue and fever.   HENT:  Negative for congestion, sneezing, sore throat, trouble swallowing and voice change.    Respiratory:  Negative for cough, shortness of breath and wheezing.    Cardiovascular:  Negative for chest pain, palpitations and leg swelling.   Gastrointestinal:         As detailed above.   Genitourinary:  Negative for dysuria and hematuria.   Musculoskeletal:  Positive for arthralgias, back pain and gait problem. Negative for myalgias.   Skin:  Negative for pallor and rash.   Neurological:  Negative for dizziness, seizures, syncope, weakness, numbness and headaches.   Hematological:  Negative for adenopathy. Does not bruise/bleed easily.   Psychiatric/Behavioral:  Negative for confusion. The patient is not nervous/anxious.    All other systems reviewed and are negative.      I performed a complete 10 point review of systems and it is negative except as noted in HPI or above.      PAST HISTORIES:       Past Medical History:  Patient Active Problem List   Diagnosis   • Acute pain of left knee   • Anxiety   • Back pain, chronic   • Chronic headaches   • Fibromyalgia   • Mirela-Danlos syndrome ([facility]HS-HCC)   • Hyperlipidemia   • Insulin resistance   • Low vitamin D level   • Migraine  "headache   • Mild intermittent asthma   • Morbid obesity (Multi)   • Candidiasis of vagina   • Knee pain   • Ankle pain   • Hyperglycemia   • Chronic interstitial cystitis   • Acquired equinovarus deformity of both feet     She has a past medical history of Acquired clubfoot, right foot, Anxiety, Asthma, Dental disease, Depression, Easy bruising, Fibromyalgia, primary, Interstitial cystitis (chronic) without hematuria, Joint pain, Personal history of other diseases of the nervous system and sense organs, Personal history of other diseases of the respiratory system, and Vision loss.    Past Surgical History:  She has a past surgical history that includes Other surgical history (2020); Other surgical history (2020); Other surgical history (2020); Other surgical history (2020); Other surgical history (2020); Other surgical history (02/10/2022);  section, classic; Knee arthroscopy w/ meniscectomy (Right); and Colonoscopy.      Social History:  She reports that she has been smoking cigarettes. She has a 12.5 pack-year smoking history. She has never used smokeless tobacco. She reports that she does not drink alcohol and does not use drugs.    Family History:  No known family history of GI disease, specifically denies any family history of pancreatitis, Crohn's, colon cancer, gastroesophageal cancer, or ulcerative colitis.    Family History   Problem Relation Name Age of Onset   • Diabetes Mother     • Mirela-Danlos syndrome Mother     • Prostate cancer Father     • Other (htn) Father     • Diabetes Father          Allergies:  Influenza virus vaccines      Objective  OBJECTIVE:       Last Recorded Vitals:  Vitals:    25 1346   BP: 113/78   Pulse: 91   SpO2: 97%   Weight: 144 kg (317 lb)   Height: 1.6 m (5' 3\")       /78   Pulse 91   Ht 1.6 m (5' 3\")   Wt 144 kg (317 lb)   SpO2 97%   BMI 56.15 kg/m²      Physical Exam:    Physical Exam  Vitals reviewed. "   Constitutional:       General: She is not in acute distress.     Appearance: She is obese. She is not ill-appearing.   HENT:      Head: Normocephalic and atraumatic.   Eyes:      General: No scleral icterus.  Cardiovascular:      Rate and Rhythm: Normal rate and regular rhythm.      Pulses: Normal pulses.      Heart sounds: Normal heart sounds. No murmur heard.  Pulmonary:      Effort: Pulmonary effort is normal. No respiratory distress.      Breath sounds: Normal breath sounds. No wheezing.   Abdominal:      General: Bowel sounds are normal.      Palpations: Abdomen is soft.      Tenderness: There is abdominal tenderness. There is no rebound.      Comments: +Carnett's sign   Musculoskeletal:         General: No swelling or deformity.   Skin:     General: Skin is warm and dry.      Coloration: Skin is not jaundiced.      Findings: No rash.   Neurological:      General: No focal deficit present.      Mental Status: She is alert and oriented to person, place, and time.   Psychiatric:         Mood and Affect: Mood normal.         Behavior: Behavior normal.         Thought Content: Thought content normal.         Judgment: Judgment normal.         Home Medications:  Prior to Admission medications    Medication Sig Start Date End Date Taking? Authorizing Provider   albuterol 90 mcg/actuation inhaler Inhale 2 puffs every 4 hours if needed. 9/25/20   Historical Provider, MD   amitriptyline (Elavil) 10 mg tablet Take 1 tablet (10 mg) by mouth once daily at bedtime.    Historical Provider, MD   buPROPion (Wellbutrin) 75 mg tablet Take 2 tablets (150 mg) by mouth once daily. 2/21/24 2/20/25  RUDI Ramirez-CNS   butalbital-acetaminophen-caff (Fioricet) -40 mg capsule Take 1 capsule by mouth once daily as needed for headaches. 7/5/23   RUDI Deleno-CNP   cholecalciferol (Vitamin D-3) 125 MCG (5000 UT) capsule Take 1 capsule (125 mcg) by mouth once daily.    Historical Provider, MD   cyanocobalamin  "(Vitamin B-12) 1,000 mcg/mL injection Inject 1 mL (1,000 mcg) into the muscle every 30 (thirty) days. 5/22/24   REHANA Ramirez   cyanocobalamin, vitamin B-12, (Vitamin B-12) 2,500 mcg tablet, sublingual SL tablet Place 1 tablet (2,500 mcg) under the tongue once daily. 5/22/24 5/22/25  REHANA Ramirez   cyclobenzaprine (Flexeril) 5 mg tablet Take 1 tablet (5 mg) by mouth 2 times a day.    Historical Provider, MD   ergocalciferol (Vitamin D-2) 1.25 MG (35891 UT) capsule Take 1 capsule (50,000 Units) by mouth 2 times a week. 5/23/24 5/23/25  REHANA Ramirez   gabapentin (Neurontin) 400 mg capsule Take 1 capsule (400 mg) by mouth 4 times a day.    Historical Provider, MD   naltrexone (Depade) 50 mg tablet Take 0.5 tablets (25 mg) by mouth once daily. 2/21/24 2/20/25  REHANA Ramirez   rimegepant (Nurtec ODT) 75 mg tablet,disintegrating Take 1 tablet (75 mg) by mouth every other day. 2/21/24   REHANA Ramirez   syringe with needle 3 mL 21 gauge x 1\" syringe 1 each every 30 (thirty) days. Use for b12 injection 5/24/24   REHANA Ramirez   syringe with needle, safety 3 mL 20 gauge x 1 1/2\" syringe 1 each every 30 (thirty) days. Use for b12 injection 5/27/24   REHANA Ramirez   torsemide (Demadex) 10 mg tablet Take 1 tablet (10 mg) by mouth once daily. 2/21/24 2/20/25  REHANA Ramirez         Relevant Results Recent labs reviewed in the EMR.    Lab Results   Component Value Date/Time    WBC 10.6 05/16/2025 1601    WBC 8.0 05/08/2025 0822    HGB 14.0 05/16/2025 1601    HGB 15.0 05/09/2025 1623    HGB 14.8 05/08/2025 0822    HGB 15.1 02/01/2025 1127    MCV 91 05/16/2025 1601    MCV 91.2 05/08/2025 0822     05/16/2025 1601     05/09/2025 1623     05/08/2025 0822     02/01/2025 1127    [id number] 05/08/2025 0822       Lab Results   Component Value Date/Time     (L) 05/16/2025 1601     05/08/2025 0822    K 3.6 " 05/16/2025 1601    K 5.2 05/08/2025 0822     05/16/2025 1601     05/08/2025 0822    BUN 17 05/16/2025 1601    BUN 16 05/08/2025 0822    CREATININE 0.72 05/16/2025 1601    CREATININE 0.85 05/09/2025 1623    CREATININE 0.68 05/08/2025 0822       Lab Results   Component Value Date/Time    BILITOT 0.3 05/16/2025 1601    BILITOT 0.4 05/09/2025 1623    BILITOT 0.3 05/08/2025 0822    BILITOT 0.3 02/01/2025 1127    ALKPHOS 51 05/16/2025 1601    ALKPHOS 58 05/09/2025 1623    ALKPHOS 77 05/08/2025 0822    ALKPHOS 60 02/01/2025 1127    AST 10 05/16/2025 1601    AST 16 05/09/2025 1623    AST 20 05/08/2025 0822    AST 11 02/01/2025 1127    ALT 17 05/16/2025 1601    ALT 21 05/09/2025 1623    ALT 22 05/08/2025 0822    ALT 13 02/01/2025 1127    LIPASE 11 05/16/2025 1601       Lab Results   Component Value Date/Time    CRP 0.17 01/29/2025 2000    CRP 0.32 08/27/2024 1615    CRP 0.97 08/24/2023 0919       Radiology: Imaging reviewed in the EMR.

## 2025-05-24 LAB
ATRIAL RATE: 84 BPM
P AXIS: 61 DEGREES
P OFFSET: 192 MS
P ONSET: 141 MS
PR INTERVAL: 162 MS
Q ONSET: 222 MS
QRS COUNT: 14 BEATS
QRS DURATION: 70 MS
QT INTERVAL: 356 MS
QTC CALCULATION(BAZETT): 420 MS
QTC FREDERICIA: 398 MS
R AXIS: 47 DEGREES
T AXIS: 52 DEGREES
T OFFSET: 400 MS
VENTRICULAR RATE: 84 BPM

## 2025-06-04 ENCOUNTER — APPOINTMENT (OUTPATIENT)
Dept: RADIOLOGY | Facility: HOSPITAL | Age: 50
End: 2025-06-04
Payer: COMMERCIAL

## 2025-07-03 ENCOUNTER — APPOINTMENT (OUTPATIENT)
Dept: PRIMARY CARE | Facility: CLINIC | Age: 50
End: 2025-07-03
Payer: COMMERCIAL

## 2025-07-08 DIAGNOSIS — Z12.31 ENCOUNTER FOR SCREENING MAMMOGRAM FOR BREAST CANCER: ICD-10-CM
